# Patient Record
Sex: FEMALE | Race: WHITE | NOT HISPANIC OR LATINO | Employment: FULL TIME | URBAN - METROPOLITAN AREA
[De-identification: names, ages, dates, MRNs, and addresses within clinical notes are randomized per-mention and may not be internally consistent; named-entity substitution may affect disease eponyms.]

---

## 2017-02-08 ENCOUNTER — OFFICE VISIT (OUTPATIENT)
Dept: FAMILY MEDICINE CLINIC | Facility: CLINIC | Age: 27
End: 2017-02-08

## 2017-02-08 VITALS
BODY MASS INDEX: 34.02 KG/M2 | WEIGHT: 192 LBS | OXYGEN SATURATION: 96 % | HEIGHT: 63 IN | HEART RATE: 94 BPM | SYSTOLIC BLOOD PRESSURE: 110 MMHG | DIASTOLIC BLOOD PRESSURE: 80 MMHG

## 2017-02-08 DIAGNOSIS — M54.2 NECK PAIN: Primary | ICD-10-CM

## 2017-02-08 DIAGNOSIS — M54.50 ACUTE BILATERAL LOW BACK PAIN WITHOUT SCIATICA: ICD-10-CM

## 2017-02-08 LAB
BILIRUB BLD-MCNC: NEGATIVE MG/DL
CLARITY, POC: CLEAR
COLOR UR: YELLOW
GLUCOSE UR STRIP-MCNC: NEGATIVE MG/DL
KETONES UR QL: NEGATIVE
LEUKOCYTE EST, POC: NEGATIVE
NITRITE UR-MCNC: NEGATIVE MG/ML
PH UR: 6.5 [PH] (ref 5–8)
PROT UR STRIP-MCNC: NEGATIVE MG/DL
RBC # UR STRIP: NEGATIVE /UL
SP GR UR: 1 (ref 1–1.03)
UROBILINOGEN UR QL: NORMAL

## 2017-02-08 PROCEDURE — 99213 OFFICE O/P EST LOW 20 MIN: CPT | Performed by: PHYSICIAN ASSISTANT

## 2017-02-08 PROCEDURE — 81003 URINALYSIS AUTO W/O SCOPE: CPT | Performed by: PHYSICIAN ASSISTANT

## 2017-02-08 RX ORDER — LEVOTHYROXINE SODIUM 0.12 MG/1
125 TABLET ORAL DAILY
COMMUNITY
Start: 2017-01-17 | End: 2022-03-08 | Stop reason: DRUGHIGH

## 2017-02-08 RX ORDER — TIZANIDINE 4 MG/1
4 TABLET ORAL DAILY
Qty: 30 TABLET | Refills: 11 | Status: SHIPPED | OUTPATIENT
Start: 2017-02-08 | End: 2017-11-01

## 2017-02-08 NOTE — PROGRESS NOTES
Subjective   Olga Osborne is a 26 y.o. female    Back Pain   This is a new problem. The problem occurs intermittently. The problem has been rapidly worsening since onset. The pain is present in the lumbar spine. The pain is at a severity of 7/10. The pain is moderate. The pain is the same all the time. The symptoms are aggravated by sitting, standing and twisting. Stiffness is present in the morning. Associated symptoms include leg pain and numbness. Pertinent negatives include no chest pain, headaches or weakness. She has tried nothing for the symptoms. The treatment provided no relief.   Neck Pain    This is a new problem. The current episode started in the past 7 days. The problem occurs constantly. The problem has been unchanged. The pain is at a severity of 6/10. The pain is moderate. The symptoms are aggravated by bending and position. Associated symptoms include leg pain and numbness. Pertinent negatives include no chest pain, headaches or weakness. Treatments tried: pain when bending neck.       The following portions of the patient's history were reviewed and updated as appropriate: allergies, current medications, past social history and problem list    Review of Systems   Constitutional: Negative for fatigue and unexpected weight change.   Respiratory: Negative for cough, chest tightness and shortness of breath.    Cardiovascular: Negative for chest pain, palpitations and leg swelling.   Gastrointestinal: Negative for nausea.   Musculoskeletal: Positive for back pain and neck pain.   Skin: Negative for color change and rash.   Neurological: Positive for numbness. Negative for dizziness, syncope, weakness and headaches.       Objective     Vitals:    02/08/17 1507   BP: 110/80   Pulse: 94   SpO2: 96%       Physical Exam   Constitutional: She is oriented to person, place, and time. She appears well-developed and well-nourished.   Cardiovascular: Normal rate and regular rhythm.    Pulmonary/Chest: Effort  normal and breath sounds normal.   Abdominal: Soft. Bowel sounds are normal.   Musculoskeletal:        Right shoulder: She exhibits decreased range of motion and tenderness.        Lumbar back: She exhibits decreased range of motion, tenderness, bony tenderness and pain. She exhibits no swelling, no deformity and no spasm.   Neurological: She is alert and oriented to person, place, and time. She has normal reflexes.   Nursing note and vitals reviewed.      Assessment/Plan     Diagnoses and all orders for this visit:    Neck pain  -     tiZANidine (ZANAFLEX) 4 MG tablet; Take 1 tablet by mouth Daily.  -     Ambulatory Referral to Physical Therapy Evaluate and treat    Acute bilateral low back pain without sciatica  -     POC Urinalysis Dipstick, Automated  -     XR Spine Lumbar 2 or 3 View  -     tiZANidine (ZANAFLEX) 4 MG tablet; Take 1 tablet by mouth Daily.  -     Ambulatory Referral to Physical Therapy Evaluate and treat    Other orders  -     levothyroxine (SYNTHROID, LEVOTHROID) 125 MCG tablet; Take 125 mcg by mouth Daily.

## 2017-03-06 ENCOUNTER — HOSPITAL ENCOUNTER (OUTPATIENT)
Dept: PHYSICAL THERAPY | Facility: HOSPITAL | Age: 27
Setting detail: THERAPIES SERIES
Discharge: HOME | End: 2017-03-06

## 2017-03-06 DIAGNOSIS — G89.29 CHRONIC BILATERAL LOW BACK PAIN WITHOUT SCIATICA: ICD-10-CM

## 2017-03-06 DIAGNOSIS — M54.2 NECK PAIN, BILATERAL: Primary | ICD-10-CM

## 2017-03-06 DIAGNOSIS — M54.50 CHRONIC BILATERAL LOW BACK PAIN WITHOUT SCIATICA: ICD-10-CM

## 2017-03-06 DIAGNOSIS — G44.229 CHRONIC TENSION-TYPE HEADACHE, NOT INTRACTABLE: ICD-10-CM

## 2017-03-06 PROCEDURE — 97110 THERAPEUTIC EXERCISES: CPT

## 2017-03-06 PROCEDURE — 97161 PT EVAL LOW COMPLEX 20 MIN: CPT

## 2017-03-06 NOTE — PROGRESS NOTES
Outpatient Physical Therapy Ortho Initial Evaluation  [facility]     Patient Name: Olga Osborne  : 1990  MRN: [id number]  Today's Date: 3/6/2017      Visit Date: 2017    There is no problem list on file for this patient.       Past Medical History   Diagnosis Date   • Hypothyroidism         Past Surgical History   Procedure Laterality Date   • Thyroidectomy  2016       Visit Dx:     ICD-10-CM ICD-9-CM   1. Neck pain, bilateral M54.2 723.1   2. Chronic bilateral low back pain without sciatica M54.5 724.2    G89.29 338.29   3. Chronic tension-type headache, not intractable G44.229 339.12             Patient History       17 1600          History    Chief Complaint Pain;Muscle tenderness;Joint stiffness  -MM      Type of Pain Neck pain;Back pain  -MM      Date Current Problem(s) Began 17  -MM      Brief Description of Current Complaint Client presents with bilateral low back pain and neck pain with headaches. She started noticing pain about a month ago in her neck and low back.   -MM      Patient/Caregiver Goals Improve mobility;Improve strength;Relieve pain  -MM      Hand Dominance right-handed  -MM      Occupation/sports/leisure activities employed at [employer]  -MM      How has patient tried to help current problem? changing positions  -MM      Are you or can you be pregnant No  -MM      Pain     Pain Location Back;Neck  -MM      Pain at Present 2  -MM      Pain at Best 0  -MM      Pain at Worst 4  -MM      Pain Frequency Intermittent   several times per day  -MM      Pain Description Dull;Aching  -MM      Pain Comments pain is worse with computer work. Lying prone provides some relief.  -MM      Tolerance Time- Standing 45 min  -MM      Tolerance Time- Sitting an hour  -MM      Tolerance Time- Walking an hour  -MM      Is your sleep disturbed? Yes  -MM      Is medication used to assist with sleep? Yes   muscle relaxer  -MM      Total hours of sleep  per night 6-7  -MM      Difficulties with ADL's? computer work, laundry  -MM      Fall Risk Assessment    Any falls in the past year: No  -MM      Daily Activities    Primary Language English  -MM      Are you able to read Yes  -MM      Are you able to write Yes  -MM      Pt Participated in POC and Goals Yes  -MM        User Key  (r) = Recorded By, (t) = Taken By, (c) = Cosigned By    Initials Name Provider Type    MM Power Rojas, PT Physical Therapist                PT Ortho       03/06/17 1600    Posture/Observations    Posture/Observations Comments mild upper thoracic kyphosis, mild forward shoulders.  -MM    Sensation    Sensation WNL? WNL  -MM    Myotomal Screen- Upper Quarter Clearing    Shoulder flexion (C5) Bilateral:;5 (Normal)  -MM    Elbow flexion/wrist extension (C6) Bilateral:;WNL  -MM    Elbow extension/wrist flexion (C7) Bilateral:;WNL  -MM    Finger flexion/ (C8) Bilateral:;WNL  -MM    Finger abduction (T1) Bilateral:;WNL  -MM     Bilateral:;WNL  -MM    Cervical/Shoulder ROM Screen    Cervical flexion Normal   50 degrees feels tight  -MM    Cervical extension Normal   48 degrees feels tight  -MM    Cervical lateral flexion Normal   L38 degrees; R: 40 degrees  -MM    Cervical rotation Impaired   R: 63 degrees. L: 55 degrees  -MM    Cervical quadrant (Spurling's) Normal  -MM    Shoulder elevation  Normal  -MM    Myotomal Screen- Lower Quarter Clearing    Hip flexion (L2) Bilateral:;WNL  -MM    Knee extension (L3) Bilateral:;WNL  -MM    Ankle DF (L4) Bilateral:;WNL  -MM    Great toe extension (L5) Bilateral:;WNL  -MM    Ankle PF (S1) Bilateral:;WNL  -MM    Knee flexion (S2) Bilateral:;WNL  -MM    Lumbar ROM Screen- Lower Quarter Clearing    Lumbar Flexion Normal  -MM    Lumbar Extension Impaired   25% with some low back pain/ same with repetition  -MM    Lumbar Lateral Flexion Impaired   75% bilateral  -MM    Lumbar Rotation Normal   some tightness noted to the left  -MM    Lumbar Quadrant   Normal   tightness low back  -MM    Cervical Palpation    Suboccipital --   tenderness on left  -MM    Levator Scapula --   trigger point and tightness manuel upper trap and lev scap  -MM    Upper Traps --   tenderness bilateral cervical paraspinals and u. trap   -MM    Cervical/Thoracic Special Tests    Cervical Distraction (Foraminal Compression vs. Facet Pain) --   no complaints  -MM    Lumbosacral Palpation    SI Bilateral:;Tender  -MM    Lumbosacral Segment Bilateral:;Tender   tender bilateral lower lumbar parasapinals, gluteus medius  -MM    Piriformis Bilateral:;Tender  -MM    Gluteus Silvino Bilateral:;Tender  -MM    Lumbar/SI Special Tests    DAVID (hip vs. SI Dysfunction) Negative  -MM    ROM (Range of Motion)    General ROM Detail mild low back discomfort with press-up. No pain with SKTC. Relief with traction.   -MM    Additional Documentation --   Hip ER AROM: L: 37 degrees. R: 24 degrees  -MM    Left Hip    Hip Extension Gross Movement (4+/5) good plus  -MM    Right Hip    Hip Extension Gross Movement (4+/5) good plus  -MM      User Key  (r) = Recorded By, (t) = Taken By, (c) = Cosigned By    Initials Name Provider Type    MM Power Rojas, PT Physical Therapist                                PT OP Goals       03/06/17 1600       PT Short Term Goals    STG Date to Achieve 03/27/17  -MM     STG 1 Client will report 50% improvement in neck pain and headaches.  -MM     STG 1 Progress New  -MM     STG 2 Client will report 50% improvement in low back pain while sitting at work.  -MM     STG 2 Progress New  -MM     STG 3 Client will be independent with home program for improved strength and mobility.  -MM     STG 3 Progress New  -MM     STG 4 Client will demonstrate neck flexion and extension without any pain or difficulty.  -MM     STG 4 Progress New  -MM     Long Term Goals    LTG Date to Achieve 04/04/14  -MM     LTG 1 Modified Oswestry score will improve to 8% disabilty or less.  -MM     LTG 1 Progress  New  -MM     LTG 2 Neck Disability score will improve to 14% disability or less.  -MM     LTG 2 Progress New  -MM     LTG 3 Bilateral upper trap trigger points will improve to minimal.  -MM     LTG 3 Progress New  -MM     LTG 4 Low back tenderness will improve to minimal.  -MM     LTG 4 Progress New  -MM     LTG 5 Client will demonstrate hip extension strength at 5/5 without pain.  -MM     LTG 5 Progress New  -MM     Time Calculation    PT Goal Re-Cert Due Date 04/06/17  -MM       User Key  (r) = Recorded By, (t) = Taken By, (c) = Cosigned By    Initials Name Provider Type    MM Power Rojas, PT Physical Therapist                PT Assessment/Plan       03/06/17 1600       PT Assessment    Functional Limitations Limitation in home management;Performance in leisure activities;Performance in self-care ADL;Performance in work activities  -MM     Impairments Pain;Muscle strength;Range of motion  -MM     Assessment Comments Client presents with neck pain, mid back pain, low back pain, and headaches that are worse after computer work. Signs and symptoms are consistent with cervical and thoracic pain related to muscle tension and trigger points. Low back pain is consistent with mild paraspinal discomfort and lumbar facet joint pain with some muscular tightness. Client notes relief with manual traction of left or right leg. Skilled intervention is required to minimize neck pain, headaches, and low back pain. Client should benefit from manual therapy and exercises to treat neck pain. Dry needling with ASTYM should help minimize trigger points. Low back pain should benefit from traction and ASTYM, in addition to exercises to maximize strength and mobility.  -MM     Please refer to paper survey for additional self-reported information Yes  -MM     Rehab Potential Fair  -MM     Patient/caregiver participated in establishment of treatment plan and goals Yes  -MM     Patient would benefit from skilled therapy intervention Yes   -MM     PT Plan    PT Frequency 2x/week  -MM     Predicted Duration of Therapy Intervention (days/wks) 6 weeks  -MM     Planned CPT's? PT EVAL LOW COMPLEXITY: 10590;PT THER PROC EA 15 MIN: 80289;PT MANUAL THERAPY EA 15 MIN: 20830;PT TRACTION LUMBAR: 74071  -MM     PT Plan Comments Continue per plan of treatment. Include exercises to maximize strength and mobility for the neck and spine. Include foam roll stretching next visit. Include dry needling for cervical paraspinals and bilateral upper trap trigger points. Include sidelying lumbar flexion mobililzation and ASTYM for lumbar spine and posterior hip. Include lumbar traction and exercises for neck and back strengthening and stretches.   -MM       User Key  (r) = Recorded By, (t) = Taken By, (c) = Cosigned By    Initials Name Provider Type    ALEXANDRA Rojas, PT Physical Therapist                  Exercises       03/06/17 1600          Subjective Pain    Able to rate subjective pain? yes  -MM      Pre-Treatment Pain Level 2  -MM      Post-Treatment Pain Level 2  -MM      Exercise 1    Exercise Name 1 Provided and reviewed home program. Exercises included seated neck rotation with towel assist, upper trap stretch, chest stretch, mid back stretch, door way stretch, angry cat stretch, prone press-ups, single knee to chest, and lower trunk rotation stretch with sheet.  -MM      Cueing 1 Verbal;Tactile;Demo  -MM      Time (Minutes) 1 15  -MM      Treatment Type 1 Ther Ex  -MM        User Key  (r) = Recorded By, (t) = Taken By, (c) = Cosigned By    Initials Name Provider Type    ALEXANDRA Rojas, PT Physical Therapist                              Outcome Measures       03/06/17 1600          Modified Oswestry    Modified Oswestry Score/Comments 16%  -MM      Neck Disability Index    Section 1 - Pain Intensity 1  -MM      Section 2 - Personal Care 0  -MM      Section 3 - Lifting 1  -MM      Section 4 - Work 1  -MM      Section 5 - Headaches 2  -MM      Section 6 -  Concentration 1  -MM      Section 7 - Sleeping 3  -MM      Section 8 - Driving 2  -MM      Section 9 - Reading 2  -MM      Section 10 - Recreation 1  -MM      Neck Disability Index Score 14  -MM      Neck Disability Index Comments 28%  -MM      Functional Assessment    Outcome Measure Options Modifed Owestry;Neck Disability Index (NDI)  -MM        User Key  (r) = Recorded By, (t) = Taken By, (c) = Cosigned By    Initials Name Provider Type    MM Power Rojas, PT Physical Therapist            Time Calculation:   Start Time: 1600  Total Timed Code Minutes- PT: 15 minute(s)     Therapy Charges for Today     Code Description Service Date Service Provider Modifiers Qty    79590505918 HC PT EVAL LOW COMPLEXITY 4 3/6/2017 Power Rojas, PT GP 1    88400047808 HC PT THER PROC EA 15 MIN 3/6/2017 Power Rojas, PT GP 1          PT G-Codes  Outcome Measure Options: Modifed Owestry, Neck Disability Index (NDI)         Power Rojas, PT  3/6/2017      Answers for HPI/ROS submitted by the patient on 2/27/2017   Back pain  Chronicity: new  Onset: 1 to 4 weeks ago  Frequency: daily  Progression since onset: waxing and waning  Pain location: lumbar spine  Pain quality: aching, shooting  Radiates to: left thigh, right thigh  Pain - numeric: 3/10  Pain is: worse during the night  Aggravated by: position, lying down, sitting  Stiffness is present: all day  abdominal pain: No  bladder incontinence: No  bowel incontinence: No  chest pain: No  dysuria: No  fever: No  headaches: Yes  leg pain: Yes  numbness: No  paresis: No  paresthesias: No  pelvic pain: No  perianal numbness: No  tingling: No  weakness: No  weight loss: No  Risk factors: obesity, poor posture

## 2017-03-14 ENCOUNTER — HOSPITAL ENCOUNTER (OUTPATIENT)
Dept: PHYSICAL THERAPY | Facility: HOSPITAL | Age: 27
Setting detail: THERAPIES SERIES
Discharge: HOME | End: 2017-03-14

## 2017-03-14 DIAGNOSIS — G44.229 CHRONIC TENSION-TYPE HEADACHE, NOT INTRACTABLE: ICD-10-CM

## 2017-03-14 DIAGNOSIS — M54.2 NECK PAIN, BILATERAL: Primary | ICD-10-CM

## 2017-03-14 DIAGNOSIS — G89.29 CHRONIC BILATERAL LOW BACK PAIN WITHOUT SCIATICA: ICD-10-CM

## 2017-03-14 DIAGNOSIS — M54.50 CHRONIC BILATERAL LOW BACK PAIN WITHOUT SCIATICA: ICD-10-CM

## 2017-03-14 PROCEDURE — 97012 MECHANICAL TRACTION THERAPY: CPT

## 2017-03-14 PROCEDURE — 97110 THERAPEUTIC EXERCISES: CPT

## 2017-03-14 PROCEDURE — 97140 MANUAL THERAPY 1/> REGIONS: CPT

## 2017-03-14 NOTE — PROGRESS NOTES
Outpatient Physical Therapy Ortho Treatment Note  [facility]     Patient Name: Olga Osborne  : 1990  MRN: [id number]  Today's Date: 3/14/2017      Visit Date: 2017    Visit Dx:    ICD-10-CM ICD-9-CM   1. Neck pain, bilateral M54.2 723.1   2. Chronic bilateral low back pain without sciatica M54.5 724.2    G89.29 338.29   3. Chronic tension-type headache, not intractable G44.229 339.12        Past Medical History   Diagnosis Date   • Hypothyroidism         Past Surgical History   Procedure Laterality Date   • Thyroidectomy  2016                 PT Assessment/Plan       17 0730       PT Assessment    Assessment Comments Relief noted with manual therapy and lumbar traction today. No complaints with treatment. Exercises also tolerated without complaints.  -MM     PT Plan    PT Plan Comments Continue per plan of treatment with manual therapy, lumbar traction, and exercises for neck and back.  -MM       User Key  (r) = Recorded By, (t) = Taken By, (c) = Cosigned By    Initials Name Provider Type    ALEXANDRA Rojas, PT Physical Therapist                Modalities       17 0730          Traction 62363    Traction Type Lumbar  -MM      Rx Minutes 10  -MM      Duration Intermittent  -MM      Position Hook-lying  -MM      Weight 55  -MM      Hold 60  -MM      Relax 10  -MM        User Key  (r) = Recorded By, (t) = Taken By, (c) = Cosigned By    Initials Name Provider Type    ALEXANDRA Rojas, PT Physical Therapist                Exercises       17 0730          Subjective Comments    Subjective Comments Client reports mild neck/left upper trap pain. Her low back feels tight, but overall she feels like prone press-ups are providing relief.  -MM      Subjective Pain    Able to rate subjective pain? yes  -MM      Pre-Treatment Pain Level 2  -MM      Post-Treatment Pain Level 1  -MM      Exercise 1    Exercise Name 1 Included exercises in clinical setting per flow sheet. Updated  home program to include rows, sweeps, and reverse fly with band resistance.  -MM      Cueing 1 Verbal  -MM      Time (Minutes) 1 15  -MM      Treatment Type 1 Ther Ex  -MM        User Key  (r) = Recorded By, (t) = Taken By, (c) = Cosigned By    Initials Name Provider Type    ALEXANDRA Rojas, PT Physical Therapist                        Manual Rx (last 36 hours)      Manual Treatments       03/14/17 1100 03/14/17 0730       Manual Rx 1    Manual Rx 1 Location --  -MM Provided soft tissue mobilization for neck and upper back. Included dry needling for left upper trap, levator scap, and lower cervical paraspinals. Also included dry needling for right upper trap. Client was prone for dry needling. Followed dry needling with ASTYM for neck and upper back. Also included cervical joint mobilizations gentle PA glides grade 2-3 throughout cervical spine. Included mobilization with cervical lateral flexion and rotation bilateral grade 5. Also included passive stretching for rotation and sidebending.   -MM     Manual Rx 1 Duration  30  -MM       User Key  (r) = Recorded By, (t) = Taken By, (c) = Cosigned By    Initials Name Provider Type    ALEXANDRA Rojas, PT Physical Therapist                        Time Calculation:   Start Time: 0730  Total Timed Code Minutes- PT: 45 minute(s)    Therapy Charges for Today     Code Description Service Date Service Provider Modifiers Qty    18528546196  PT THER PROC EA 15 MIN 3/14/2017 Power Rojas, PT GP 1    48765246726 HC PT MANUAL THERAPY EA 15 MIN 3/14/2017 Power Rojas PT GP 2    52009957819 HC PT TRACTION LUMBAR 3/14/2017 Power Rojas, PT GP 1                    Power Rojas, PT  3/14/2017

## 2017-03-16 ENCOUNTER — HOSPITAL ENCOUNTER (OUTPATIENT)
Dept: PHYSICAL THERAPY | Facility: HOSPITAL | Age: 27
Setting detail: THERAPIES SERIES
Discharge: HOME | End: 2017-03-16

## 2017-03-16 DIAGNOSIS — M54.50 CHRONIC BILATERAL LOW BACK PAIN WITHOUT SCIATICA: ICD-10-CM

## 2017-03-16 DIAGNOSIS — G44.229 CHRONIC TENSION-TYPE HEADACHE, NOT INTRACTABLE: ICD-10-CM

## 2017-03-16 DIAGNOSIS — M54.2 NECK PAIN, BILATERAL: Primary | ICD-10-CM

## 2017-03-16 DIAGNOSIS — G89.29 CHRONIC BILATERAL LOW BACK PAIN WITHOUT SCIATICA: ICD-10-CM

## 2017-03-16 PROCEDURE — 97140 MANUAL THERAPY 1/> REGIONS: CPT

## 2017-03-16 PROCEDURE — 97110 THERAPEUTIC EXERCISES: CPT

## 2017-03-16 PROCEDURE — 97012 MECHANICAL TRACTION THERAPY: CPT

## 2017-03-16 NOTE — PROGRESS NOTES
Outpatient Physical Therapy Ortho Treatment Note  [facility]     Patient Name: Olga Osborne  : 1990  MRN: [id number]  Today's Date: 3/16/2017      Visit Date: 2017    Visit Dx:    ICD-10-CM ICD-9-CM   1. Neck pain, bilateral M54.2 723.1   2. Chronic bilateral low back pain without sciatica M54.5 724.2    G89.29 338.29   3. Chronic tension-type headache, not intractable G44.229 339.12        Past Medical History   Diagnosis Date   • Hypothyroidism         Past Surgical History   Procedure Laterality Date   • Thyroidectomy  2016                             PT Assessment/Plan       17 0815       PT Assessment    Assessment Comments Some tightness continues at left upper trap. No complaints with treatment. Some relief with traction. Progression of exercise was tolerated without complaints.  -MM     PT Plan    PT Plan Comments Continue per plan of treatment with exercises, traction, and manual therapy.  -MM       User Key  (r) = Recorded By, (t) = Taken By, (c) = Cosigned By    Initials Name Provider Type    ALEXANDRA Rojas, PT Physical Therapist                Modalities       17 0815          Traction 64956    Traction Type Lumbar  -MM      Rx Minutes 15  -MM      Duration Intermittent  -MM      Position Hook-lying  -MM      Weight 55  -MM      Hold 60  -MM      Relax 10  -MM        User Key  (r) = Recorded By, (t) = Taken By, (c) = Cosigned By    Initials Name Provider Type    ALEXANDRA Rojas, PT Physical Therapist                Exercises       17 0815          Subjective Comments    Subjective Comments Client reports mild neck pain today at 1/10.  -MM      Subjective Pain    Able to rate subjective pain? yes  -MM      Pre-Treatment Pain Level 1  -MM      Post-Treatment Pain Level 0  -MM      Exercise 1    Exercise Name 1 Included exercises in clinical setting per flow sheet.   -MM      Cueing 1 Verbal  -MM      Time (Minutes) 1 10  -MM      Treatment Type 1 Ther  Ex  -MM        User Key  (r) = Recorded By, (t) = Taken By, (c) = Cosigned By    Initials Name Provider Type    ALEXANDRA Rojas PT Physical Therapist                        Manual Rx (last 36 hours)      Manual Treatments       03/16/17 0815          Manual Rx 1    Manual Rx 1 Location Provided soft tissue mobilization using ASTYM for neck and upper back. Also included dry needling for left upper trap. Included stretching for lateral flexion and rotation.   -MM      Manual Rx 1 Duration 20  -MM        User Key  (r) = Recorded By, (t) = Taken By, (c) = Cosigned By    Initials Name Provider Type    ALEXANDRA Rojas PT Physical Therapist                        Time Calculation:   Start Time: 0815  Total Timed Code Minutes- PT: 30 minute(s)    Therapy Charges for Today     Code Description Service Date Service Provider Modifiers Qty    43535309761  PT MANUAL THERAPY EA 15 MIN 3/16/2017 Power Rojas, PT GP 1    22586203222  PT THER PROC EA 15 MIN 3/16/2017 Power Rojas, PT GP 1    [id number]  PT TRACTION LUMBAR 3/16/2017 Power Rojas, PT GP 1                    Power Rojas, PT  3/16/2017

## 2017-03-21 ENCOUNTER — HOSPITAL ENCOUNTER (OUTPATIENT)
Dept: PHYSICAL THERAPY | Facility: HOSPITAL | Age: 27
Setting detail: THERAPIES SERIES
Discharge: HOME | End: 2017-03-21

## 2017-03-21 DIAGNOSIS — M54.2 NECK PAIN, BILATERAL: Primary | ICD-10-CM

## 2017-03-21 DIAGNOSIS — G89.29 CHRONIC BILATERAL LOW BACK PAIN WITHOUT SCIATICA: ICD-10-CM

## 2017-03-21 DIAGNOSIS — G44.229 CHRONIC TENSION-TYPE HEADACHE, NOT INTRACTABLE: ICD-10-CM

## 2017-03-21 DIAGNOSIS — M54.50 CHRONIC BILATERAL LOW BACK PAIN WITHOUT SCIATICA: ICD-10-CM

## 2017-03-21 PROCEDURE — 97140 MANUAL THERAPY 1/> REGIONS: CPT

## 2017-03-21 PROCEDURE — 97110 THERAPEUTIC EXERCISES: CPT

## 2017-03-21 NOTE — PROGRESS NOTES
Outpatient Physical Therapy Ortho Treatment Note  [facility]     Patient Name: Olga Osborne  : 1990  MRN: [id number]  Today's Date: 3/21/2017      Visit Date: 2017    Visit Dx:    ICD-10-CM ICD-9-CM   1. Neck pain, bilateral M54.2 723.1   2. Chronic bilateral low back pain without sciatica M54.5 724.2    G89.29 338.29   3. Chronic tension-type headache, not intractable G44.229 339.12         Past Medical History   Diagnosis Date   • Hypothyroidism         Past Surgical History   Procedure Laterality Date   • Thyroidectomy  2016               PT Assessment/Plan       17 0730       PT Assessment    Assessment Comments Overall good progress. Neck pain was mild today and low back pain resolved. Held on traction because she wasn't having any low back pain. Trigger points noted at levator scap bilateral. Some discomfort at CT junction. Manual therapy and exercises were all tolerated without complaints.  -MM     PT Plan    PT Plan Comments Continue per plan of treatment with manual therapy and exercises.   -MM       User Key  (r) = Recorded By, (t) = Taken By, (c) = Cosigned By    Initials Name Provider Type    ALEXANDRA Rojas, PT Physical Therapist                    Exercises       17 0730          Subjective Comments    Subjective Comments Client reports mild neck pain today at cervical/thoracic junction.  -MM      Subjective Pain    Able to rate subjective pain? yes  -MM      Pre-Treatment Pain Level 1  -MM      Post-Treatment Pain Level 1  -MM      Exercise 1    Exercise Name 1 Included exercises in clinical setting per flow sheet. Progressed exercises to include arm/leg raise on all fours, neck rotation and extension on all fours, and levator scap stretch.  -MM      Cueing 1 Verbal  -MM      Time (Minutes) 1 15  -MM      Treatment Type 1 Ther Ex  -MM        User Key  (r) = Recorded By, (t) = Taken By, (c) = Cosigned By    Initials Name Provider Type    ALEXANDRA Rojas,  PT Physical Therapist                        Manual Rx (last 36 hours)      Manual Treatments       03/21/17 0730          Manual Rx 1    Manual Rx 1 Location Provided soft tissue mobilization using ASTYM technique for neck and upper back. Also included passive stretching for rotation and lateral flexion bilateral. Included prone lateral glides for CT junction. Included seated CT junction manipulation. Also included prone thoracic screw manipulation for mid thoracic region. Client had tigger points bilateral levator scap.  -MM      Manual Rx 1 Duration 30  -MM        User Key  (r) = Recorded By, (t) = Taken By, (c) = Cosigned By    Initials Name Provider Type    MM Power Rojas, PT Physical Therapist                        Time Calculation:   Start Time: 0730  Total Timed Code Minutes- PT: 45 minute(s)    Therapy Charges for Today     Code Description Service Date Service Provider Modifiers Qty    06162909316  PT MANUAL THERAPY EA 15 MIN 3/21/2017 Power Rojas, PT GP 2    [id number]  PT THER PROC EA 15 MIN 3/21/2017 Power oRjas, PT GP 1                    Power Rojas PT  3/21/2017

## 2017-03-27 ENCOUNTER — HOSPITAL ENCOUNTER (OUTPATIENT)
Dept: PHYSICAL THERAPY | Facility: HOSPITAL | Age: 27
Setting detail: THERAPIES SERIES
Discharge: HOME | End: 2017-03-27

## 2017-03-27 DIAGNOSIS — G44.229 CHRONIC TENSION-TYPE HEADACHE, NOT INTRACTABLE: ICD-10-CM

## 2017-03-27 DIAGNOSIS — M54.50 CHRONIC BILATERAL LOW BACK PAIN WITHOUT SCIATICA: ICD-10-CM

## 2017-03-27 DIAGNOSIS — G89.29 CHRONIC BILATERAL LOW BACK PAIN WITHOUT SCIATICA: ICD-10-CM

## 2017-03-27 DIAGNOSIS — M54.2 NECK PAIN, BILATERAL: Primary | ICD-10-CM

## 2017-03-27 PROCEDURE — 97140 MANUAL THERAPY 1/> REGIONS: CPT

## 2017-03-27 PROCEDURE — 97012 MECHANICAL TRACTION THERAPY: CPT

## 2017-03-27 PROCEDURE — 97110 THERAPEUTIC EXERCISES: CPT

## 2017-03-27 NOTE — PROGRESS NOTES
Outpatient Physical Therapy Ortho Treatment Note  [facility]     Patient Name: Olga Osborne  : 1990  MRN: [id number]  Today's Date: 3/27/2017      Visit Date: 2017    Visit Dx:    ICD-10-CM ICD-9-CM   1. Neck pain, bilateral M54.2 723.1   2. Chronic bilateral low back pain without sciatica M54.5 724.2    G89.29 338.29   3. Chronic tension-type headache, not intractable G44.229 339.12          Past Medical History:   Diagnosis Date   • Hypothyroidism         Past Surgical History:   Procedure Laterality Date   • THYROIDECTOMY  2016                             PT Assessment/Plan       17 0730       PT Assessment    Assessment Comments Neck pain continues to be mild. Low back pain is improving, but she noticed some bilateral leg pain today. Traction was included today to minimize radicular leg discomfort. Some relief noted with manual therapy. No complaints with traction.  -MM     PT Plan    PT Plan Comments Continue per plan of treatment with manual therapy, traction, and exercises.   -MM       User Key  (r) = Recorded By, (t) = Taken By, (c) = Cosigned By    Initials Name Provider Type    ALEXANDRA Rojas, PT Physical Therapist                Modalities       17 0730          Traction 34382    Traction Type Lumbar  -MM      Rx Minutes 15  -MM      Duration Intermittent  -MM      Position Hook-lying  -MM      Weight 55  -MM      Hold 60  -MM      Relax 10  -MM        User Key  (r) = Recorded By, (t) = Taken By, (c) = Cosigned By    Initials Name Provider Type    ALEXANDRA Rojas, PT Physical Therapist                Exercises       17 0730          Subjective Comments    Subjective Comments Client reports mild left neck pain today and upper trap tightness. She also noticed bilateral leg pain that started 2 days ago from her hips to calves.   -MM      Subjective Pain    Able to rate subjective pain? yes  -MM      Pre-Treatment Pain Level 1  -MM      Post-Treatment  Pain Level 0  -MM      Exercise 1    Exercise Name 1 Included exercises in clinical setting per flow sheet.   -MM      Cueing 1 Verbal  -MM      Time (Minutes) 1 15  -MM      Treatment Type 1 Ther Ex  -MM        User Key  (r) = Recorded By, (t) = Taken By, (c) = Cosigned By    Initials Name Provider Type    ALEXANDRA Rojas PT Physical Therapist                        Manual Rx (last 36 hours)      Manual Treatments       03/27/17 0730          Manual Rx 1    Manual Rx 1 Location Provided soft tissue mobilization using ASTYM technique for neck and upper thoracic  region. Also included dry needling left upper trap (3 minutes) prior to ASTYM. Trigger point noted left upper trap and levator scap.  -MM      Manual Rx 1 Duration 15  -MM        User Key  (r) = Recorded By, (t) = Taken By, (c) = Cosigned By    Initials Name Provider Type    ALEXANDRA Rojas, PT Physical Therapist                        Time Calculation:   Start Time: 0730  Total Timed Code Minutes- PT: 30 minute(s)    Therapy Charges for Today     Code Description Service Date Service Provider Modifiers Qty    70200018767  PT MANUAL THERAPY EA 15 MIN 3/27/2017 Power Rojas, PT GP 1    [id number]  PT THER PROC EA 15 MIN 3/27/2017 Power Rojas, PT GP 1    [id number]  PT TRACTION LUMBAR 3/27/2017 Power Rojas, PT GP 1                    Power Rojas, PT  3/27/2017

## 2017-04-17 ENCOUNTER — TELEPHONE (OUTPATIENT)
Dept: FAMILY MEDICINE CLINIC | Facility: CLINIC | Age: 27
End: 2017-04-17

## 2017-04-24 ENCOUNTER — DOCUMENTATION (OUTPATIENT)
Dept: PHYSICAL THERAPY | Facility: HOSPITAL | Age: 27
End: 2017-04-24

## 2017-04-24 DIAGNOSIS — G44.229 CHRONIC TENSION-TYPE HEADACHE, NOT INTRACTABLE: ICD-10-CM

## 2017-04-24 DIAGNOSIS — G89.29 CHRONIC BILATERAL LOW BACK PAIN WITHOUT SCIATICA: ICD-10-CM

## 2017-04-24 DIAGNOSIS — M54.50 CHRONIC BILATERAL LOW BACK PAIN WITHOUT SCIATICA: ICD-10-CM

## 2017-04-24 DIAGNOSIS — M54.2 NECK PAIN, BILATERAL: Primary | ICD-10-CM

## 2017-04-24 NOTE — THERAPY DISCHARGE NOTE
Outpatient Physical Therapy Discharge Summary         Patient Name: Olga Osborne  : 1990  MRN: [id number]    Today's Date: 2017    Visit Dx:    ICD-10-CM ICD-9-CM   1. Neck pain, bilateral M54.2 723.1   2. Chronic bilateral low back pain without sciatica M54.5 724.2    G89.29 338.29   3. Chronic tension-type headache, not intractable G44.229 339.12             PT OP Goals       17 0900       PT Short Term Goals    STG Date to Achieve 17  -MM     STG 1 Client will report 50% improvement in neck pain and headaches.  -MM     STG 1 Progress Not Met  -MM     STG 2 Client will report 50% improvement in low back pain while sitting at work.  -MM     STG 2 Progress Not Met  -MM     STG 3 Client will be independent with home program for improved strength and mobility.  -MM     STG 3 Progress Not Met  -MM     STG 4 Client will demonstrate neck flexion and extension without any pain or difficulty.  -MM     STG 4 Progress Not Met  -MM     Long Term Goals    LTG Date to Achieve 14  -MM     LTG 1 Modified Oswestry score will improve to 8% disabilty or less.  -MM     LTG 1 Progress Not Met  -MM     LTG 2 Neck Disability score will improve to 14% disability or less.  -MM     LTG 2 Progress Not Met  -MM     LTG 3 Bilateral upper trap trigger points will improve to minimal.  -MM     LTG 3 Progress Not Met  -MM     LTG 4 Low back tenderness will improve to minimal.  -MM     LTG 4 Progress Not Met  -MM     LTG 5 Client will demonstrate hip extension strength at 5/5 without pain.  -MM     LTG 5 Progress Not Met  -MM       User Key  (r) = Recorded By, (t) = Taken By, (c) = Cosigned By    Initials Name Provider Type    ALEXANDRA Rojas, PT Physical Therapist          OP PT Discharge Summary  Date of Discharge: 17  Reason for Discharge: other (comment) (Client has not returned for further treatment.)  Outcomes Achieved: Other (Goals were abandoned since she hasn't returned.)  Discharge  Destination: Home without follow-up  Discharge Instructions: Client was treated for 5 visits to minimize neck pain, headaches, and low back pain. At last visit she noted improvement with neck pain and back pain. She did have some bilateral leg discomfort. She received lumbar traction and didn't have any complaints with traction. Treatment also included exercises and manual therapy. Prognosis at discharge is fair.            Power Rojas, PT  4/24/2017

## 2017-11-01 ENCOUNTER — APPOINTMENT (OUTPATIENT)
Dept: LAB | Facility: HOSPITAL | Age: 27
End: 2017-11-01

## 2017-11-01 ENCOUNTER — OFFICE VISIT (OUTPATIENT)
Dept: FAMILY MEDICINE CLINIC | Facility: CLINIC | Age: 27
End: 2017-11-01

## 2017-11-01 VITALS
SYSTOLIC BLOOD PRESSURE: 108 MMHG | WEIGHT: 190.4 LBS | HEART RATE: 85 BPM | HEIGHT: 63 IN | RESPIRATION RATE: 14 BRPM | DIASTOLIC BLOOD PRESSURE: 76 MMHG | OXYGEN SATURATION: 98 % | BODY MASS INDEX: 33.73 KG/M2

## 2017-11-01 DIAGNOSIS — R53.83 FATIGUE, UNSPECIFIED TYPE: Primary | ICD-10-CM

## 2017-11-01 DIAGNOSIS — H53.8 VISION BLURRED: ICD-10-CM

## 2017-11-01 LAB
ALBUMIN SERPL-MCNC: 4.6 G/DL (ref 3.2–4.8)
ALBUMIN/GLOB SERPL: 1.7 G/DL (ref 1.5–2.5)
ALP SERPL-CCNC: 53 U/L (ref 25–100)
ALT SERPL W P-5'-P-CCNC: 11 U/L (ref 7–40)
ANION GAP SERPL CALCULATED.3IONS-SCNC: 6 MMOL/L (ref 3–11)
AST SERPL-CCNC: 16 U/L (ref 0–33)
BASOPHILS # BLD AUTO: 0.06 10*3/MM3 (ref 0–0.2)
BASOPHILS NFR BLD AUTO: 1 % (ref 0–1)
BILIRUB SERPL-MCNC: 0.5 MG/DL (ref 0.3–1.2)
BUN BLD-MCNC: 6 MG/DL (ref 9–23)
BUN/CREAT SERPL: 7.5 (ref 7–25)
CALCIUM SPEC-SCNC: 9 MG/DL (ref 8.7–10.4)
CHLORIDE SERPL-SCNC: 102 MMOL/L (ref 99–109)
CO2 SERPL-SCNC: 32 MMOL/L (ref 20–31)
CREAT BLD-MCNC: 0.8 MG/DL (ref 0.6–1.3)
DEPRECATED RDW RBC AUTO: 43.8 FL (ref 37–54)
EOSINOPHIL # BLD AUTO: 0.02 10*3/MM3 (ref 0–0.3)
EOSINOPHIL NFR BLD AUTO: 0.3 % (ref 0–3)
ERYTHROCYTE [DISTWIDTH] IN BLOOD BY AUTOMATED COUNT: 12.9 % (ref 11.3–14.5)
GFR SERPL CREATININE-BSD FRML MDRD: 86 ML/MIN/1.73
GLOBULIN UR ELPH-MCNC: 2.7 GM/DL
GLUCOSE BLD-MCNC: 70 MG/DL (ref 70–100)
HCT VFR BLD AUTO: 43.3 % (ref 34.5–44)
HGB BLD-MCNC: 14.8 G/DL (ref 11.5–15.5)
IMM GRANULOCYTES # BLD: 0.01 10*3/MM3 (ref 0–0.03)
IMM GRANULOCYTES NFR BLD: 0.2 % (ref 0–0.6)
IRON 24H UR-MRATE: 61 MCG/DL (ref 50–175)
LYMPHOCYTES # BLD AUTO: 1.21 10*3/MM3 (ref 0.6–4.8)
LYMPHOCYTES NFR BLD AUTO: 21.1 % (ref 24–44)
MCH RBC QN AUTO: 31.7 PG (ref 27–31)
MCHC RBC AUTO-ENTMCNC: 34.2 G/DL (ref 32–36)
MCV RBC AUTO: 92.7 FL (ref 80–99)
MONOCYTES # BLD AUTO: 0.26 10*3/MM3 (ref 0–1)
MONOCYTES NFR BLD AUTO: 4.5 % (ref 0–12)
NEUTROPHILS # BLD AUTO: 4.17 10*3/MM3 (ref 1.5–8.3)
NEUTROPHILS NFR BLD AUTO: 72.9 % (ref 41–71)
PLATELET # BLD AUTO: 309 10*3/MM3 (ref 150–450)
PMV BLD AUTO: 10.5 FL (ref 6–12)
POTASSIUM BLD-SCNC: 4.6 MMOL/L (ref 3.5–5.5)
PROT SERPL-MCNC: 7.3 G/DL (ref 5.7–8.2)
RBC # BLD AUTO: 4.67 10*6/MM3 (ref 3.89–5.14)
SODIUM BLD-SCNC: 140 MMOL/L (ref 132–146)
WBC NRBC COR # BLD: 5.73 10*3/MM3 (ref 3.5–10.8)

## 2017-11-01 PROCEDURE — 99213 OFFICE O/P EST LOW 20 MIN: CPT | Performed by: PHYSICIAN ASSISTANT

## 2017-11-01 PROCEDURE — 80053 COMPREHEN METABOLIC PANEL: CPT | Performed by: PHYSICIAN ASSISTANT

## 2017-11-01 PROCEDURE — 83540 ASSAY OF IRON: CPT | Performed by: PHYSICIAN ASSISTANT

## 2017-11-01 PROCEDURE — 36415 COLL VENOUS BLD VENIPUNCTURE: CPT | Performed by: PHYSICIAN ASSISTANT

## 2017-11-01 PROCEDURE — 85025 COMPLETE CBC W/AUTO DIFF WBC: CPT | Performed by: PHYSICIAN ASSISTANT

## 2017-11-01 NOTE — PROGRESS NOTES
Subjective   Olga Osborne is a 26 y.o. female    History of Present Illness  Patient 26-year-old white female comes in complaining of fatigue no energy  States has no energy this is been going on for some time it does not seem be related to her previous thyroid and endocrine problems.  Fatigue is worse in the mornings.  No fever no chills no night sweats    Patient comes in complaining of blurred vision she states her left eye she's having episodes of decreased vision in both right and left thighs she states that her eyesight seems be getting worse and she's concerned is been having over the last 3 months denies any eye pain any eye discharge.      The following portions of the patient's history were reviewed and updated as appropriate: allergies, current medications, past social history and problem list    Review of Systems   Constitutional: Positive for fatigue. Negative for appetite change, diaphoresis and unexpected weight change.   Eyes: Negative for visual disturbance.   Respiratory: Negative for cough, chest tightness and shortness of breath.    Cardiovascular: Negative for chest pain, palpitations and leg swelling.   Gastrointestinal: Negative for diarrhea, nausea and vomiting.   Endocrine: Negative for polydipsia, polyphagia and polyuria.   Skin: Negative for color change and rash.   Neurological: Negative for dizziness, syncope, weakness, light-headedness, numbness and headaches.       Objective     Vitals:    11/01/17 1154   BP: 108/76   Pulse: 85   Resp: 14   SpO2: 98%       Physical Exam   Constitutional: She appears well-developed and well-nourished.   Neck: Neck supple. No JVD present. No thyromegaly present.   Cardiovascular: Normal rate, regular rhythm, normal heart sounds, intact distal pulses and normal pulses.    No murmur heard.  Pulmonary/Chest: Effort normal and breath sounds normal. No respiratory distress.   Abdominal: Soft. Bowel sounds are normal. There is no hepatosplenomegaly. There is  no tenderness.   Musculoskeletal: She exhibits no edema.   Lymphadenopathy:     She has no cervical adenopathy.   Neurological: No sensory deficit.   Skin: Skin is warm and dry. She is not diaphoretic.   Nursing note and vitals reviewed.      Assessment/Plan     Diagnoses and all orders for this visit:    Fatigue, unspecified type  -     CBC & Differential  -     Comprehensive Metabolic Panel  -     Iron  -     CBC Auto Differential    Vision blurred  -     Ambulatory Referral to Ophthalmology    follow-up as needed

## 2017-11-03 ENCOUNTER — TELEPHONE (OUTPATIENT)
Dept: FAMILY MEDICINE CLINIC | Facility: CLINIC | Age: 27
End: 2017-11-03

## 2017-11-03 NOTE — TELEPHONE ENCOUNTER
----- Message from Cielo Hatch sent at 11/3/2017 12:11 PM EDT -----  Contact: PT.  PT. SEE'S KYLEE.  PT. IS WANTING THE RESULTS OF HER LABS DONE ON:  Tuesday, @ [facility].  PT. CAN BE REACHED @: [facility] #.

## 2017-11-06 ENCOUNTER — TELEPHONE (OUTPATIENT)
Dept: FAMILY MEDICINE CLINIC | Facility: CLINIC | Age: 27
End: 2017-11-06

## 2017-11-06 NOTE — TELEPHONE ENCOUNTER
----- Message from Alyssa Matthews sent at 11/6/2017  9:04 AM EST -----  Contact: PATIENT  PLEASE CALL HER WITH LAB RESULTS FROM LAST WEEK. [phone]

## 2018-04-02 ENCOUNTER — OFFICE VISIT (OUTPATIENT)
Dept: FAMILY MEDICINE CLINIC | Facility: CLINIC | Age: 28
End: 2018-04-02

## 2018-04-02 VITALS
OXYGEN SATURATION: 98 % | HEART RATE: 75 BPM | BODY MASS INDEX: 34.34 KG/M2 | WEIGHT: 193.8 LBS | RESPIRATION RATE: 14 BRPM | TEMPERATURE: 98.5 F | DIASTOLIC BLOOD PRESSURE: 72 MMHG | HEIGHT: 63 IN | SYSTOLIC BLOOD PRESSURE: 104 MMHG

## 2018-04-02 DIAGNOSIS — R10.9 ABDOMINAL PAIN, UNSPECIFIED ABDOMINAL LOCATION: Primary | ICD-10-CM

## 2018-04-02 LAB
BILIRUB BLD-MCNC: NEGATIVE MG/DL
CLARITY, POC: CLEAR
COLOR UR: NORMAL
GLUCOSE UR STRIP-MCNC: NEGATIVE MG/DL
KETONES UR QL: NEGATIVE
LEUKOCYTE EST, POC: NEGATIVE
NITRITE UR-MCNC: NEGATIVE MG/ML
PH UR: 7 [PH] (ref 5–8)
PROT UR STRIP-MCNC: NEGATIVE MG/DL
RBC # UR STRIP: NEGATIVE /UL
SP GR UR: 1 (ref 1–1.03)
UROBILINOGEN UR QL: NORMAL

## 2018-04-02 PROCEDURE — 81003 URINALYSIS AUTO W/O SCOPE: CPT | Performed by: PHYSICIAN ASSISTANT

## 2018-04-02 PROCEDURE — 99213 OFFICE O/P EST LOW 20 MIN: CPT | Performed by: PHYSICIAN ASSISTANT

## 2018-04-02 RX ORDER — CIPROFLOXACIN 500 MG/1
500 TABLET, FILM COATED ORAL 2 TIMES DAILY
Qty: 14 TABLET | Refills: 0 | Status: SHIPPED | OUTPATIENT
Start: 2018-04-02 | End: 2019-01-02

## 2018-04-02 RX ORDER — FLUCONAZOLE 150 MG/1
150 TABLET ORAL ONCE
Qty: 1 TABLET | Refills: 0 | Status: SHIPPED | OUTPATIENT
Start: 2018-04-02 | End: 2018-04-02

## 2018-04-02 NOTE — PROGRESS NOTES
Subjective   Olga Osborne is a 27 y.o. female  Abdominal Pain (Oliguria, shooting pains in groin area intermittent x1 week)      History of Present Illness  Patient pleasant 27-year-old white female comes in complaining of left lower quadrant pain patient's pain off and on for last week.  Patient's pain is 5 out of 10.  Patient states comes and goes she states that she's had occasional dysuria patient's pain is worse with sitting standing.  No fever no chills no bloody diarrhea.  Patient denies any chance of pregnancy    The following portions of the patient's history were reviewed and updated as appropriate: allergies, current medications, past social history and problem list    Review of Systems   Constitutional: Negative for appetite change, diaphoresis, fatigue and unexpected weight change.   Eyes: Negative for visual disturbance.   Respiratory: Negative for cough, chest tightness and shortness of breath.    Cardiovascular: Negative for chest pain, palpitations and leg swelling.   Gastrointestinal: Positive for abdominal pain. Negative for diarrhea, nausea and vomiting.   Endocrine: Negative for polydipsia, polyphagia and polyuria.   Skin: Negative for color change and rash.   Neurological: Negative for dizziness, syncope, weakness, light-headedness, numbness and headaches.       Objective     Vitals:    04/02/18 1043   BP: 104/72   Pulse: 75   Resp: 14   Temp: 98.5 °F (36.9 °C)   SpO2: 98%       Physical Exam   Constitutional: She appears well-developed and well-nourished.   Neck: Neck supple. No JVD present. No thyromegaly present.   Cardiovascular: Normal rate, regular rhythm, normal heart sounds, intact distal pulses and normal pulses.    No murmur heard.  Pulmonary/Chest: Effort normal and breath sounds normal. No respiratory distress.   Abdominal: Soft. Bowel sounds are normal. There is no hepatosplenomegaly. There is no tenderness.       Musculoskeletal: She exhibits no edema.   Lymphadenopathy:     She  has no cervical adenopathy.   Neurological: No sensory deficit.   Skin: Skin is warm and dry. She is not diaphoretic.   Nursing note and vitals reviewed.      Assessment/Plan     Diagnoses and all orders for this visit:    Abdominal pain, unspecified abdominal location  -     POCT urinalysis dipstick, automated  -     ciprofloxacin (CIPRO) 500 MG tablet; Take 1 tablet by mouth 2 (Two) Times a Day.  -     fluconazole (DIFLUCAN) 150 MG tablet; Take 1 tablet by mouth 1 (One) Time for 1 dose.    Follow-up if no better consider diverticulosis/ diverticulitis    Follow-up if no better

## 2018-07-24 DIAGNOSIS — M54.50 ACUTE BILATERAL LOW BACK PAIN WITHOUT SCIATICA: ICD-10-CM

## 2018-07-24 DIAGNOSIS — M54.2 NECK PAIN: ICD-10-CM

## 2018-07-25 RX ORDER — TIZANIDINE 4 MG/1
TABLET ORAL
Qty: 30 TABLET | Refills: 10 | OUTPATIENT
Start: 2018-07-25

## 2018-10-19 LAB
EXTERNAL HEPATITIS B SURFACE ANTIGEN: NEGATIVE
EXTERNAL RUBELLA QUALITATIVE: NORMAL
EXTERNAL SYPHILIS RPR SCREEN: NORMAL

## 2019-01-02 ENCOUNTER — OFFICE VISIT (OUTPATIENT)
Dept: FAMILY MEDICINE CLINIC | Facility: CLINIC | Age: 29
End: 2019-01-02

## 2019-01-02 VITALS
SYSTOLIC BLOOD PRESSURE: 116 MMHG | HEIGHT: 63 IN | TEMPERATURE: 98.4 F | HEART RATE: 78 BPM | WEIGHT: 189 LBS | BODY MASS INDEX: 33.49 KG/M2 | OXYGEN SATURATION: 98 % | DIASTOLIC BLOOD PRESSURE: 72 MMHG

## 2019-01-02 DIAGNOSIS — J20.9 ACUTE BRONCHITIS, UNSPECIFIED ORGANISM: Primary | ICD-10-CM

## 2019-01-02 PROCEDURE — 99213 OFFICE O/P EST LOW 20 MIN: CPT | Performed by: PHYSICIAN ASSISTANT

## 2019-01-02 RX ORDER — ALBUTEROL SULFATE 90 UG/1
AEROSOL, METERED RESPIRATORY (INHALATION)
Qty: 1 INHALER | Refills: 0 | Status: SHIPPED | OUTPATIENT
Start: 2019-01-02 | End: 2019-02-04 | Stop reason: ALTCHOICE

## 2019-01-02 RX ORDER — PRENATAL VIT NO.126/IRON/FOLIC 28MG-0.8MG
TABLET ORAL DAILY
COMMUNITY
End: 2019-01-22

## 2019-01-02 RX ORDER — PREDNISONE 20 MG/1
20 TABLET ORAL DAILY
Qty: 5 TABLET | Refills: 0 | Status: SHIPPED | OUTPATIENT
Start: 2019-01-02 | End: 2019-01-22

## 2019-01-02 RX ORDER — AMOXICILLIN 875 MG/1
875 TABLET, COATED ORAL 2 TIMES DAILY
Qty: 20 TABLET | Refills: 0 | Status: SHIPPED | OUTPATIENT
Start: 2019-01-02 | End: 2019-01-22

## 2019-01-02 NOTE — PROGRESS NOTES
Subjective   Olga Osborne is a 28 y.o. female  Cough (dry cough x1 month )      History of Present Illness  Patient comes in for evaluation of a persistent cough for the past 4 weeks.  She states this started when she got a cold and the cough has persisted.  She states she doesn't feel sick she just keeps coughing day and night.  She denies shortness breath denies wheezing, she is a nonsmoker, has no history of asthma, she is 19 weeks pregnant.  She's been using plain Robitussin over-the-counter without any relief.  She has some nasal congestion.  The following portions of the patient's history were reviewed and updated as appropriate: allergies, current medications, past social history and problem list    Review of Systems   Constitutional: Negative for fever.   HENT: Positive for congestion, postnasal drip and rhinorrhea. Negative for sinus pressure, sneezing, sore throat and voice change.    Respiratory: Positive for cough. Negative for chest tightness, shortness of breath and wheezing.    Cardiovascular: Negative.        Objective     Vitals:    01/02/19 1026   BP: 116/72   Pulse: 78   Temp: 98.4 °F (36.9 °C)   SpO2: 98%       Physical Exam   Constitutional: She appears well-developed and well-nourished.  Non-toxic appearance. She does not have a sickly appearance. She does not appear ill. No distress.   HENT:   Head: Normocephalic and atraumatic.   Nose: Nose normal.   Mouth/Throat: Oropharynx is clear and moist.   Neck: Neck supple. No JVD present.   Cardiovascular: Normal rate, regular rhythm and normal heart sounds.   No murmur heard.  Pulmonary/Chest: Effort normal. No stridor. No respiratory distress. She has wheezes ( Light wheezes with expiration).   Musculoskeletal: She exhibits no edema.   Lymphadenopathy:     She has no cervical adenopathy.   Neurological: She is alert.   Skin: Skin is warm and dry. She is not diaphoretic. No pallor.   Nursing note and vitals reviewed.      Assessment/Plan      Diagnoses and all orders for this visit:    Acute bronchitis, unspecified organism    Other orders  -     Prenatal Vit-Fe Fumarate-FA (PRENATAL, CLASSIC, VITAMIN) 28-0.8 MG tablet tablet; Take  by mouth Daily.  -     amoxicillin (AMOXIL) 875 MG tablet; Take 1 tablet by mouth 2 (Two) Times a Day.  -     predniSONE (DELTASONE) 20 MG tablet; Take 1 tablet by mouth Daily.  -     albuterol sulfate  (90 Base) MCG/ACT inhaler; Use 1-2 puffs every 4 hours as needed for cough associated with bronchitis

## 2019-01-11 ENCOUNTER — TELEPHONE (OUTPATIENT)
Dept: FAMILY MEDICINE CLINIC | Facility: CLINIC | Age: 29
End: 2019-01-11

## 2019-01-11 RX ORDER — AZITHROMYCIN 250 MG/1
TABLET, FILM COATED ORAL
Qty: 6 TABLET | Refills: 0 | Status: SHIPPED | OUTPATIENT
Start: 2019-01-11 | End: 2019-01-22

## 2019-01-11 NOTE — TELEPHONE ENCOUNTER
Patient was last seen on 01/02/2019, tried to call patient to get more info on symptoms- no voicemail

## 2019-01-11 NOTE — TELEPHONE ENCOUNTER
----- Message from Alyssa Matthews sent at 1/11/2019 12:40 PM EST -----  Contact: PATIENT  SHE WAS IN ON 1/2/19 WITH BRONCHITIS. HAS ONE DAY OF ANTIBIOTIC LEFT AND HAS NOT IMPROVED MUCH. SHE IS NOW COUGHING A LOT. WANTED TO KNOW IF SOMETHING ELSE CAN BE SENT IN FOR HER.     [facility] [address] AT [facility] - [phone]  - [phone] (Phone)  [phone] (Fax)

## 2019-01-22 ENCOUNTER — OFFICE VISIT (OUTPATIENT)
Dept: FAMILY MEDICINE CLINIC | Facility: CLINIC | Age: 29
End: 2019-01-22

## 2019-01-22 VITALS
DIASTOLIC BLOOD PRESSURE: 78 MMHG | HEART RATE: 91 BPM | SYSTOLIC BLOOD PRESSURE: 122 MMHG | OXYGEN SATURATION: 99 % | HEIGHT: 63 IN | WEIGHT: 191 LBS | BODY MASS INDEX: 33.84 KG/M2 | TEMPERATURE: 98.4 F

## 2019-01-22 DIAGNOSIS — R05.3 CHRONIC COUGH: Primary | ICD-10-CM

## 2019-01-22 DIAGNOSIS — J98.01 BRONCHOSPASM: ICD-10-CM

## 2019-01-22 DIAGNOSIS — Z3A.22 22 WEEKS GESTATION OF PREGNANCY: ICD-10-CM

## 2019-01-22 PROCEDURE — 99213 OFFICE O/P EST LOW 20 MIN: CPT | Performed by: PHYSICIAN ASSISTANT

## 2019-01-22 RX ORDER — ASCORBIC ACID, CHOLECALCIFEROL, .ALPHA.-TOCOPHEROL ACETATE, DL-, PYRIDOXINE, FOLIC ACID, CYANOCOBALAMIN, CALCIUM, FERROUS FUMARATE, MAGNESIUM, DOCONEXENT 85; 200; 10; 25; 1; 12; 140; 27; 45; 300 [IU]/1; [IU]/1; [IU]/1; [IU]/1; MG/1; UG/1; MG/1; MG/1; MG/1; MG/1
CAPSULE, GELATIN COATED ORAL
COMMUNITY
Start: 2018-11-26 | End: 2022-03-08

## 2019-01-22 NOTE — PROGRESS NOTES
Subjective   Olga Osborne is a 28 y.o. female  Cough (Consistant dry cough x3 months )      History of Present Illness  Patient comes in today for evaluation of a persistent dry cough with wheezing for the past 3 months.  Please see previous office notes, patient has been treated with amoxicillin, Z-Josiah and oral prednisone as well as albuterol inhaler.  She states none of these have made any difference at all.  She coughs more after talking and when going to sleep at night.  She is 22 weeks pregnant.  She denies fever states cough is dry she denies any nasal congestion and denies any significant acid reflux.  Has no history of asthma or allergies.  The following portions of the patient's history were reviewed and updated as appropriate: allergies, current medications, past social history and problem list    Review of Systems   Constitutional: Negative for chills, diaphoresis, fatigue, fever and unexpected weight change.   HENT: Negative for congestion.    Respiratory: Positive for cough and wheezing. Negative for choking, chest tightness and shortness of breath.    Cardiovascular: Negative for chest pain and leg swelling.   Gastrointestinal: Negative.  Negative for nausea.   Musculoskeletal: Negative for back pain.   Allergic/Immunologic: Negative for environmental allergies, food allergies and immunocompromised state.   Psychiatric/Behavioral: Positive for sleep disturbance.       Objective     Vitals:    01/22/19 1004   BP: 122/78   Pulse: 91   Temp: 98.4 °F (36.9 °C)   SpO2: 99%       Physical Exam   Constitutional: She appears well-developed and well-nourished.  Non-toxic appearance. She does not have a sickly appearance. She does not appear ill. No distress.   Cardiovascular: Normal rate, regular rhythm and normal heart sounds.   No murmur heard.  Pulmonary/Chest: Effort normal. No respiratory distress. She has wheezes. She has no rales. She exhibits no tenderness.   Skin: Skin is warm and dry. She is not  diaphoretic.   Nursing note and vitals reviewed.      Assessment/Plan     Diagnoses and all orders for this visit:    Chronic cough    Bronchospasm    22 weeks gestation of pregnancy    Other orders  -     Prenat w/o O-DN-Lrfimvk-FA-DHA (PNV-DHA) 27-0.6-0.4-300 MG capsule;   -     fluticasone-salmeterol (ADVAIR DISKUS) 250-50 MCG/DOSE DISKUS; Inhale 1 puff 2 (Two) Times a Day. Rinse mouth out after use    Patient will call back and notify me she is feeling better with the next week if symptoms persist would recommend referral to pulmonary and a trial of a PPI.

## 2019-02-04 ENCOUNTER — OFFICE VISIT (OUTPATIENT)
Dept: FAMILY MEDICINE CLINIC | Facility: CLINIC | Age: 29
End: 2019-02-04

## 2019-02-04 VITALS
SYSTOLIC BLOOD PRESSURE: 118 MMHG | HEART RATE: 93 BPM | BODY MASS INDEX: 34.91 KG/M2 | HEIGHT: 63 IN | OXYGEN SATURATION: 99 % | DIASTOLIC BLOOD PRESSURE: 70 MMHG | TEMPERATURE: 98.5 F | WEIGHT: 197 LBS

## 2019-02-04 DIAGNOSIS — R05.3 CHRONIC COUGH: Primary | ICD-10-CM

## 2019-02-04 DIAGNOSIS — M76.892 TENDINITIS OF LEFT KNEE: ICD-10-CM

## 2019-02-04 PROCEDURE — 99213 OFFICE O/P EST LOW 20 MIN: CPT | Performed by: PHYSICIAN ASSISTANT

## 2019-02-04 RX ORDER — RANITIDINE 150 MG/1
150 TABLET ORAL 2 TIMES DAILY
Qty: 60 TABLET | Refills: 1 | Status: SHIPPED | OUTPATIENT
Start: 2019-02-04 | End: 2019-05-29 | Stop reason: HOSPADM

## 2019-02-04 NOTE — PROGRESS NOTES
Subjective   Olga Osborne is a 28 y.o. female  Cough (dry cough with wheezing since November ) and Knee Pain (Left knee pain with burning sensation x2 weeks )      History of Present Illness  Patient comes in today for evaluation 2 separate issues one is a persistent dry cough with wheezing since November.  Please see multiple previous office notes.  Patient is currently pregnant.  No improvement whatsoever has been noted by patient with albuterol or Advair inhaler.  She says she doesn't feel sick, no fever, no shortness of breath she just has a dry cough day and night.  She denies any nasal congestion or postnasal drainage.  Symptoms have not improved with antibiotics.  No history of asthma in the past.  The following portions of the patient's history were reviewed and updated as appropriate: allergies, current medications, past social history and problem list    Review of Systems   Constitutional: Negative.    HENT: Negative.    Respiratory: Positive for cough. Negative for apnea, choking, chest tightness, shortness of breath, wheezing and stridor.    Gastrointestinal: Negative.    Musculoskeletal: Positive for arthralgias.        Patient has experienced some tenderness on the lateral side of her left knee On and off for the last couple of weeks, no history of trauma, no swelling       Objective     Vitals:    02/04/19 1500   BP: 118/70   Pulse: 93   Temp: 98.5 °F (36.9 °C)   SpO2: 99%       Physical Exam   Constitutional: She appears well-developed and well-nourished. No distress.   HENT:   Head: Normocephalic and atraumatic.   Right Ear: External ear normal.   Left Ear: External ear normal.   Nose: Nose normal.   Mouth/Throat: Oropharynx is clear and moist. No oropharyngeal exudate.   Eyes: Conjunctivae are normal. Right eye exhibits no discharge. Left eye exhibits no discharge.   Neck: Normal range of motion. Neck supple. No JVD present.   Cardiovascular: Normal rate, regular rhythm, normal heart sounds and  intact distal pulses.   No murmur heard.  Pulmonary/Chest: Effort normal and breath sounds normal. No respiratory distress. She exhibits no tenderness.   Abdominal: Soft. She exhibits no distension. There is no tenderness.   Musculoskeletal: Normal range of motion. She exhibits no edema, tenderness or deformity.   Lymphadenopathy:     She has no cervical adenopathy.   Skin: Skin is warm and dry. She is not diaphoretic. No erythema. No pallor.   Psychiatric: She has a normal mood and affect.   Nursing note and vitals reviewed.      Assessment/Plan     Diagnoses and all orders for this visit:    Chronic cough  -     Ambulatory Referral to Pulmonology    Tendinitis of left knee    Other orders  -     raNITIdine (ZANTAC) 150 MG tablet; Take 1 tablet by mouth 2 (Two) Times a Day.    Discussed recommendations of stretching exercises, cool compress and Tylenol as needed for knee, will try Zantac for possible GERD as cause of cough and refer to pulmonary.  She is to discontinue her steroid inhaler

## 2019-05-02 ENCOUNTER — TRANSCRIBE ORDERS (OUTPATIENT)
Dept: LAB | Facility: HOSPITAL | Age: 29
End: 2019-05-02

## 2019-05-02 ENCOUNTER — HOSPITAL ENCOUNTER (OUTPATIENT)
Facility: HOSPITAL | Age: 29
End: 2019-05-02
Attending: OBSTETRICS & GYNECOLOGY | Admitting: OBSTETRICS & GYNECOLOGY

## 2019-05-02 ENCOUNTER — LAB (OUTPATIENT)
Dept: LAB | Facility: HOSPITAL | Age: 29
End: 2019-05-02

## 2019-05-02 DIAGNOSIS — Z34.83 PRENATAL CARE, SUBSEQUENT PREGNANCY, THIRD TRIMESTER: Primary | ICD-10-CM

## 2019-05-02 DIAGNOSIS — Z34.83 PRENATAL CARE, SUBSEQUENT PREGNANCY, THIRD TRIMESTER: ICD-10-CM

## 2019-05-02 PROCEDURE — 87081 CULTURE SCREEN ONLY: CPT

## 2019-05-05 LAB — BACTERIA SPEC AEROBE CULT: ABNORMAL

## 2019-05-26 ENCOUNTER — PREP FOR SURGERY (OUTPATIENT)
Dept: OTHER | Facility: HOSPITAL | Age: 29
End: 2019-05-26

## 2019-05-26 ENCOUNTER — HOSPITAL ENCOUNTER (INPATIENT)
Dept: LABOR AND DELIVERY | Facility: HOSPITAL | Age: 29
LOS: 3 days | Discharge: HOME | End: 2019-05-29
Attending: OBSTETRICS & GYNECOLOGY | Admitting: OBSTETRICS & GYNECOLOGY

## 2019-05-26 PROBLEM — Z34.90 PREGNANCY: Status: ACTIVE | Noted: 2019-05-26

## 2019-05-26 LAB
ABO GROUP BLD: NORMAL
AMPHET+METHAMPHET UR QL: NEGATIVE
AMPHETAMINES UR QL: NEGATIVE
ANTI-D, PASSIVE: NORMAL
BARBITURATES UR QL SCN: NEGATIVE
BASOPHILS # BLD AUTO: 0.02 10*3/MM3 (ref 0–0.2)
BASOPHILS NFR BLD AUTO: 0.2 % (ref 0–1.5)
BENZODIAZ UR QL SCN: NEGATIVE
BLD GP AB SCN SERPL QL: POSITIVE
BUPRENORPHINE SERPL-MCNC: NEGATIVE NG/ML
CANNABINOIDS SERPL QL: NEGATIVE
COCAINE UR QL: NEGATIVE
DEPRECATED RDW RBC AUTO: 42.6 FL (ref 37–54)
EOSINOPHIL # BLD AUTO: 0.07 10*3/MM3 (ref 0–0.4)
EOSINOPHIL NFR BLD AUTO: 0.5 % (ref 0.3–6.2)
ERYTHROCYTE [DISTWIDTH] IN BLOOD BY AUTOMATED COUNT: 13.7 % (ref 12.3–15.4)
HCT VFR BLD AUTO: 31.6 % (ref 34–46.6)
HGB BLD-MCNC: 10.7 G/DL (ref 12–15.9)
IMM GRANULOCYTES # BLD AUTO: 0.04 10*3/MM3 (ref 0–0.05)
IMM GRANULOCYTES NFR BLD AUTO: 0.3 % (ref 0–0.5)
LYMPHOCYTES # BLD AUTO: 1.78 10*3/MM3 (ref 0.7–3.1)
LYMPHOCYTES NFR BLD AUTO: 13.6 % (ref 19.6–45.3)
MCH RBC QN AUTO: 28.9 PG (ref 26.6–33)
MCHC RBC AUTO-ENTMCNC: 33.9 G/DL (ref 31.5–35.7)
MCV RBC AUTO: 85.4 FL (ref 79–97)
METHADONE UR QL SCN: NEGATIVE
MONOCYTES # BLD AUTO: 0.62 10*3/MM3 (ref 0.1–0.9)
MONOCYTES NFR BLD AUTO: 4.7 % (ref 5–12)
NEUTROPHILS # BLD AUTO: 10.58 10*3/MM3 (ref 1.7–7)
NEUTROPHILS NFR BLD AUTO: 80.7 % (ref 42.7–76)
OPIATES UR QL: NEGATIVE
OXYCODONE UR QL SCN: NEGATIVE
PCP UR QL SCN: NEGATIVE
PLATELET # BLD AUTO: 290 10*3/MM3 (ref 140–450)
PMV BLD AUTO: 11.4 FL (ref 6–12)
PROPOXYPH UR QL: NEGATIVE
RBC # BLD AUTO: 3.7 10*6/MM3 (ref 3.77–5.28)
RH BLD: NEGATIVE
T&S EXPIRATION DATE: NORMAL
TRICYCLICS UR QL SCN: NEGATIVE
WBC NRBC COR # BLD: 13.11 10*3/MM3 (ref 3.4–10.8)

## 2019-05-26 PROCEDURE — 59025 FETAL NON-STRESS TEST: CPT

## 2019-05-26 PROCEDURE — 86900 BLOOD TYPING SEROLOGIC ABO: CPT

## 2019-05-26 PROCEDURE — 59200 INSERT CERVICAL DILATOR: CPT | Performed by: OBSTETRICS & GYNECOLOGY

## 2019-05-26 PROCEDURE — 3E033VJ INTRODUCTION OF OTHER HORMONE INTO PERIPHERAL VEIN, PERCUTANEOUS APPROACH: ICD-10-PCS | Performed by: OBSTETRICS & GYNECOLOGY

## 2019-05-26 PROCEDURE — 86901 BLOOD TYPING SEROLOGIC RH(D): CPT | Performed by: OBSTETRICS & GYNECOLOGY

## 2019-05-26 PROCEDURE — 86900 BLOOD TYPING SEROLOGIC ABO: CPT | Performed by: OBSTETRICS & GYNECOLOGY

## 2019-05-26 PROCEDURE — 86901 BLOOD TYPING SEROLOGIC RH(D): CPT

## 2019-05-26 PROCEDURE — 86850 RBC ANTIBODY SCREEN: CPT | Performed by: OBSTETRICS & GYNECOLOGY

## 2019-05-26 PROCEDURE — 25010000002 PENICILLIN G POTASSIUM PER 600000 UNITS: Performed by: OBSTETRICS & GYNECOLOGY

## 2019-05-26 PROCEDURE — 85025 COMPLETE CBC W/AUTO DIFF WBC: CPT | Performed by: OBSTETRICS & GYNECOLOGY

## 2019-05-26 PROCEDURE — 80306 DRUG TEST PRSMV INSTRMNT: CPT | Performed by: OBSTETRICS & GYNECOLOGY

## 2019-05-26 PROCEDURE — 86870 RBC ANTIBODY IDENTIFICATION: CPT | Performed by: OBSTETRICS & GYNECOLOGY

## 2019-05-26 PROCEDURE — 25010000002 BUTORPHANOL PER 1 MG: Performed by: OBSTETRICS & GYNECOLOGY

## 2019-05-26 RX ORDER — METHYLERGONOVINE MALEATE 0.2 MG/ML
200 INJECTION INTRAVENOUS ONCE AS NEEDED
Status: CANCELLED | OUTPATIENT
Start: 2019-05-26

## 2019-05-26 RX ORDER — MAGNESIUM CARB/ALUMINUM HYDROX 105-160MG
30 TABLET,CHEWABLE ORAL ONCE
Status: DISCONTINUED | OUTPATIENT
Start: 2019-05-26 | End: 2019-05-27 | Stop reason: HOSPADM

## 2019-05-26 RX ORDER — OXYTOCIN-SODIUM CHLORIDE 0.9% IV SOLN 30 UNIT/500ML 30-0.9/5 UT/ML-%
2-24 SOLUTION INTRAVENOUS
Status: CANCELLED | OUTPATIENT
Start: 2019-05-26

## 2019-05-26 RX ORDER — SODIUM CHLORIDE 0.9 % (FLUSH) 0.9 %
1-10 SYRINGE (ML) INJECTION AS NEEDED
Status: DISCONTINUED | OUTPATIENT
Start: 2019-05-26 | End: 2019-05-27 | Stop reason: HOSPADM

## 2019-05-26 RX ORDER — MISOPROSTOL 100 MCG
25 TABLET ORAL ONCE
Status: COMPLETED | OUTPATIENT
Start: 2019-05-26 | End: 2019-05-26

## 2019-05-26 RX ORDER — BUTORPHANOL TARTRATE 1 MG/ML
1 INJECTION, SOLUTION INTRAMUSCULAR; INTRAVENOUS
Status: CANCELLED | OUTPATIENT
Start: 2019-05-26

## 2019-05-26 RX ORDER — SODIUM CHLORIDE 0.9 % (FLUSH) 0.9 %
3 SYRINGE (ML) INJECTION EVERY 12 HOURS SCHEDULED
Status: CANCELLED | OUTPATIENT
Start: 2019-05-26

## 2019-05-26 RX ORDER — OXYTOCIN-SODIUM CHLORIDE 0.9% IV SOLN 30 UNIT/500ML 30-0.9/5 UT/ML-%
85 SOLUTION INTRAVENOUS ONCE
Status: CANCELLED | OUTPATIENT
Start: 2019-05-26

## 2019-05-26 RX ORDER — OXYTOCIN-SODIUM CHLORIDE 0.9% IV SOLN 30 UNIT/500ML 30-0.9/5 UT/ML-%
2-30 SOLUTION INTRAVENOUS
Status: DISCONTINUED | OUTPATIENT
Start: 2019-05-26 | End: 2019-05-27 | Stop reason: HOSPADM

## 2019-05-26 RX ORDER — CARBOPROST TROMETHAMINE 250 UG/ML
250 INJECTION, SOLUTION INTRAMUSCULAR AS NEEDED
Status: CANCELLED | OUTPATIENT
Start: 2019-05-26

## 2019-05-26 RX ORDER — SODIUM CHLORIDE 0.9 % (FLUSH) 0.9 %
3 SYRINGE (ML) INJECTION EVERY 12 HOURS SCHEDULED
Status: DISCONTINUED | OUTPATIENT
Start: 2019-05-26 | End: 2019-05-27 | Stop reason: HOSPADM

## 2019-05-26 RX ORDER — BUTORPHANOL TARTRATE 1 MG/ML
1 INJECTION, SOLUTION INTRAMUSCULAR; INTRAVENOUS
Status: DISCONTINUED | OUTPATIENT
Start: 2019-05-26 | End: 2019-05-27 | Stop reason: HOSPADM

## 2019-05-26 RX ORDER — SODIUM CHLORIDE, SODIUM LACTATE, POTASSIUM CHLORIDE, CALCIUM CHLORIDE 600; 310; 30; 20 MG/100ML; MG/100ML; MG/100ML; MG/100ML
125 INJECTION, SOLUTION INTRAVENOUS CONTINUOUS
Status: DISCONTINUED | OUTPATIENT
Start: 2019-05-26 | End: 2019-05-27

## 2019-05-26 RX ORDER — SODIUM CHLORIDE 0.9 % (FLUSH) 0.9 %
1-10 SYRINGE (ML) INJECTION AS NEEDED
Status: CANCELLED | OUTPATIENT
Start: 2019-05-26

## 2019-05-26 RX ORDER — LIDOCAINE HYDROCHLORIDE 10 MG/ML
5 INJECTION, SOLUTION EPIDURAL; INFILTRATION; INTRACAUDAL; PERINEURAL AS NEEDED
Status: CANCELLED | OUTPATIENT
Start: 2019-05-26

## 2019-05-26 RX ORDER — LIDOCAINE HYDROCHLORIDE 10 MG/ML
5 INJECTION, SOLUTION EPIDURAL; INFILTRATION; INTRACAUDAL; PERINEURAL AS NEEDED
Status: DISCONTINUED | OUTPATIENT
Start: 2019-05-26 | End: 2019-05-27 | Stop reason: HOSPADM

## 2019-05-26 RX ORDER — MISOPROSTOL 200 UG/1
800 TABLET ORAL AS NEEDED
Status: CANCELLED | OUTPATIENT
Start: 2019-05-26

## 2019-05-26 RX ORDER — OXYTOCIN-SODIUM CHLORIDE 0.9% IV SOLN 30 UNIT/500ML 30-0.9/5 UT/ML-%
650 SOLUTION INTRAVENOUS ONCE
Status: CANCELLED | OUTPATIENT
Start: 2019-05-26

## 2019-05-26 RX ORDER — MISOPROSTOL 100 MCG
25 TABLET ORAL ONCE
Status: CANCELLED | OUTPATIENT
Start: 2019-05-26 | End: 2019-05-26

## 2019-05-26 RX ORDER — PENICILLIN G 3000000 [IU]/50ML
3 INJECTION, SOLUTION INTRAVENOUS EVERY 4 HOURS
Status: DISCONTINUED | OUTPATIENT
Start: 2019-05-26 | End: 2019-05-27 | Stop reason: HOSPADM

## 2019-05-26 RX ORDER — SODIUM CHLORIDE, SODIUM LACTATE, POTASSIUM CHLORIDE, CALCIUM CHLORIDE 600; 310; 30; 20 MG/100ML; MG/100ML; MG/100ML; MG/100ML
125 INJECTION, SOLUTION INTRAVENOUS CONTINUOUS
Status: CANCELLED | OUTPATIENT
Start: 2019-05-26

## 2019-05-26 RX ORDER — MAGNESIUM CARB/ALUMINUM HYDROX 105-160MG
30 TABLET,CHEWABLE ORAL ONCE
Status: CANCELLED | OUTPATIENT
Start: 2019-05-26 | End: 2019-05-26

## 2019-05-26 RX ADMIN — BUTORPHANOL TARTRATE 1 MG: 1 INJECTION, SOLUTION INTRAMUSCULAR; INTRAVENOUS at 22:57

## 2019-05-26 RX ADMIN — SODIUM CHLORIDE, POTASSIUM CHLORIDE, SODIUM LACTATE AND CALCIUM CHLORIDE 125 ML/HR: 600; 310; 30; 20 INJECTION, SOLUTION INTRAVENOUS at 18:15

## 2019-05-26 RX ADMIN — MISOPROSTOL 25 MCG: 100 TABLET ORAL at 18:25

## 2019-05-26 RX ADMIN — PENICILLIN G 3 MILLION UNITS: 3000000 INJECTION, SOLUTION INTRAVENOUS at 22:56

## 2019-05-26 RX ADMIN — OXYTOCIN 2 MILLI-UNITS/MIN: 10 INJECTION, SOLUTION INTRAMUSCULAR; INTRAVENOUS at 23:00

## 2019-05-26 RX ADMIN — SODIUM CHLORIDE, POTASSIUM CHLORIDE, SODIUM LACTATE AND CALCIUM CHLORIDE 125 ML/HR: 600; 310; 30; 20 INJECTION, SOLUTION INTRAVENOUS at 22:55

## 2019-05-26 RX ADMIN — SODIUM CHLORIDE 5 MILLION UNITS: 900 INJECTION INTRAVENOUS at 18:25

## 2019-05-27 ENCOUNTER — ANESTHESIA (OUTPATIENT)
Dept: LABOR AND DELIVERY | Facility: HOSPITAL | Age: 29
End: 2019-05-27

## 2019-05-27 ENCOUNTER — ANESTHESIA EVENT (OUTPATIENT)
Dept: LABOR AND DELIVERY | Facility: HOSPITAL | Age: 29
End: 2019-05-27

## 2019-05-27 PROBLEM — Z34.90 PREGNANCY: Status: RESOLVED | Noted: 2019-05-26 | Resolved: 2019-05-27

## 2019-05-27 LAB
ABO GROUP BLD: NORMAL
FETAL BLEED: NEGATIVE
NUMBER OF DOSES: NORMAL
RH BLD: NEGATIVE

## 2019-05-27 PROCEDURE — 51702 INSERT TEMP BLADDER CATH: CPT

## 2019-05-27 PROCEDURE — 86901 BLOOD TYPING SEROLOGIC RH(D): CPT | Performed by: OBSTETRICS & GYNECOLOGY

## 2019-05-27 PROCEDURE — 86900 BLOOD TYPING SEROLOGIC ABO: CPT | Performed by: OBSTETRICS & GYNECOLOGY

## 2019-05-27 PROCEDURE — 25010000002 ONDANSETRON PER 1 MG: Performed by: ANESTHESIOLOGY

## 2019-05-27 PROCEDURE — 59025 FETAL NON-STRESS TEST: CPT

## 2019-05-27 PROCEDURE — 25010000002 PENICILLIN G POTASSIUM PER 600000 UNITS: Performed by: OBSTETRICS & GYNECOLOGY

## 2019-05-27 PROCEDURE — 0KQM0ZZ REPAIR PERINEUM MUSCLE, OPEN APPROACH: ICD-10-PCS | Performed by: OBSTETRICS & GYNECOLOGY

## 2019-05-27 PROCEDURE — C1755 CATHETER, INTRASPINAL: HCPCS

## 2019-05-27 PROCEDURE — 85461 HEMOGLOBIN FETAL: CPT | Performed by: OBSTETRICS & GYNECOLOGY

## 2019-05-27 PROCEDURE — 25010000002 BUTORPHANOL PER 1 MG: Performed by: OBSTETRICS & GYNECOLOGY

## 2019-05-27 PROCEDURE — C1755 CATHETER, INTRASPINAL: HCPCS | Performed by: ANESTHESIOLOGY

## 2019-05-27 PROCEDURE — 25010000002 ROPIVACAINE PER 1 MG: Performed by: ANESTHESIOLOGY

## 2019-05-27 PROCEDURE — 25010000002 FENTANYL CITRATE (PF) 100 MCG/2ML SOLUTION: Performed by: ANESTHESIOLOGY

## 2019-05-27 RX ORDER — SODIUM CHLORIDE 0.9 % (FLUSH) 0.9 %
1-10 SYRINGE (ML) INJECTION AS NEEDED
Status: DISCONTINUED | OUTPATIENT
Start: 2019-05-27 | End: 2019-05-29 | Stop reason: HOSPADM

## 2019-05-27 RX ORDER — METHYLERGONOVINE MALEATE 0.2 MG/ML
200 INJECTION INTRAVENOUS ONCE AS NEEDED
Status: DISCONTINUED | OUTPATIENT
Start: 2019-05-27 | End: 2019-05-27 | Stop reason: HOSPADM

## 2019-05-27 RX ORDER — DIPHENHYDRAMINE HYDROCHLORIDE 50 MG/ML
12.5 INJECTION INTRAMUSCULAR; INTRAVENOUS EVERY 8 HOURS PRN
Status: DISCONTINUED | OUTPATIENT
Start: 2019-05-27 | End: 2019-05-27 | Stop reason: HOSPADM

## 2019-05-27 RX ORDER — ROPIVACAINE HYDROCHLORIDE 2 MG/ML
12 INJECTION, SOLUTION EPIDURAL; INFILTRATION; PERINEURAL CONTINUOUS
Status: DISCONTINUED | OUTPATIENT
Start: 2019-05-27 | End: 2019-05-27

## 2019-05-27 RX ORDER — MISOPROSTOL 200 UG/1
800 TABLET ORAL AS NEEDED
Status: DISCONTINUED | OUTPATIENT
Start: 2019-05-27 | End: 2019-05-27 | Stop reason: HOSPADM

## 2019-05-27 RX ORDER — ONDANSETRON 2 MG/ML
4 INJECTION INTRAMUSCULAR; INTRAVENOUS ONCE AS NEEDED
Status: COMPLETED | OUTPATIENT
Start: 2019-05-27 | End: 2019-05-27

## 2019-05-27 RX ORDER — LIDOCAINE HYDROCHLORIDE AND EPINEPHRINE 20; 5 MG/ML; UG/ML
INJECTION, SOLUTION EPIDURAL; INFILTRATION; INTRACAUDAL; PERINEURAL AS NEEDED
Status: DISCONTINUED | OUTPATIENT
Start: 2019-05-27 | End: 2019-05-27 | Stop reason: SURG

## 2019-05-27 RX ORDER — OXYTOCIN-SODIUM CHLORIDE 0.9% IV SOLN 30 UNIT/500ML 30-0.9/5 UT/ML-%
650 SOLUTION INTRAVENOUS ONCE
Status: COMPLETED | OUTPATIENT
Start: 2019-05-27 | End: 2019-05-27

## 2019-05-27 RX ORDER — EPHEDRINE SULFATE/0.9% NACL/PF 25 MG/5 ML
10 SYRINGE (ML) INTRAVENOUS
Status: DISCONTINUED | OUTPATIENT
Start: 2019-05-27 | End: 2019-05-27 | Stop reason: HOSPADM

## 2019-05-27 RX ORDER — OXYTOCIN-SODIUM CHLORIDE 0.9% IV SOLN 30 UNIT/500ML 30-0.9/5 UT/ML-%
85 SOLUTION INTRAVENOUS ONCE
Status: DISCONTINUED | OUTPATIENT
Start: 2019-05-27 | End: 2019-05-27 | Stop reason: HOSPADM

## 2019-05-27 RX ORDER — DOCUSATE SODIUM 100 MG/1
100 CAPSULE, LIQUID FILLED ORAL 2 TIMES DAILY
Status: DISCONTINUED | OUTPATIENT
Start: 2019-05-27 | End: 2019-05-29 | Stop reason: HOSPADM

## 2019-05-27 RX ORDER — PROMETHAZINE HYDROCHLORIDE 25 MG/ML
12.5 INJECTION, SOLUTION INTRAMUSCULAR; INTRAVENOUS EVERY 4 HOURS PRN
Status: DISCONTINUED | OUTPATIENT
Start: 2019-05-27 | End: 2019-05-29 | Stop reason: HOSPADM

## 2019-05-27 RX ORDER — HYDROCODONE BITARTRATE AND ACETAMINOPHEN 5; 325 MG/1; MG/1
1 TABLET ORAL EVERY 4 HOURS PRN
Status: DISCONTINUED | OUTPATIENT
Start: 2019-05-27 | End: 2019-05-29 | Stop reason: HOSPADM

## 2019-05-27 RX ORDER — BISACODYL 10 MG
10 SUPPOSITORY, RECTAL RECTAL DAILY PRN
Status: DISCONTINUED | OUTPATIENT
Start: 2019-05-28 | End: 2019-05-29 | Stop reason: HOSPADM

## 2019-05-27 RX ORDER — FENTANYL CITRATE 50 UG/ML
INJECTION, SOLUTION INTRAMUSCULAR; INTRAVENOUS AS NEEDED
Status: DISCONTINUED | OUTPATIENT
Start: 2019-05-27 | End: 2019-05-27 | Stop reason: SURG

## 2019-05-27 RX ORDER — METOCLOPRAMIDE HYDROCHLORIDE 5 MG/ML
10 INJECTION INTRAMUSCULAR; INTRAVENOUS ONCE AS NEEDED
Status: DISCONTINUED | OUTPATIENT
Start: 2019-05-27 | End: 2019-05-27 | Stop reason: HOSPADM

## 2019-05-27 RX ORDER — LIDOCAINE HYDROCHLORIDE AND EPINEPHRINE 15; 5 MG/ML; UG/ML
INJECTION, SOLUTION EPIDURAL AS NEEDED
Status: DISCONTINUED | OUTPATIENT
Start: 2019-05-27 | End: 2019-05-27 | Stop reason: SURG

## 2019-05-27 RX ORDER — TRISODIUM CITRATE DIHYDRATE AND CITRIC ACID MONOHYDRATE 500; 334 MG/5ML; MG/5ML
30 SOLUTION ORAL ONCE
Status: DISCONTINUED | OUTPATIENT
Start: 2019-05-27 | End: 2019-05-27 | Stop reason: HOSPADM

## 2019-05-27 RX ORDER — CARBOPROST TROMETHAMINE 250 UG/ML
250 INJECTION, SOLUTION INTRAMUSCULAR AS NEEDED
Status: DISCONTINUED | OUTPATIENT
Start: 2019-05-27 | End: 2019-05-27 | Stop reason: HOSPADM

## 2019-05-27 RX ORDER — LANOLIN 100 %
OINTMENT (GRAM) TOPICAL
Status: DISCONTINUED | OUTPATIENT
Start: 2019-05-27 | End: 2019-05-29 | Stop reason: HOSPADM

## 2019-05-27 RX ORDER — IBUPROFEN 600 MG/1
600 TABLET ORAL EVERY 6 HOURS PRN
Status: DISCONTINUED | OUTPATIENT
Start: 2019-05-27 | End: 2019-05-29 | Stop reason: HOSPADM

## 2019-05-27 RX ORDER — ONDANSETRON 2 MG/ML
4 INJECTION INTRAMUSCULAR; INTRAVENOUS EVERY 6 HOURS PRN
Status: DISCONTINUED | OUTPATIENT
Start: 2019-05-27 | End: 2019-05-29 | Stop reason: HOSPADM

## 2019-05-27 RX ORDER — PRENATAL VIT/IRON FUM/FOLIC AC 27MG-0.8MG
1 TABLET ORAL DAILY
Status: DISCONTINUED | OUTPATIENT
Start: 2019-05-28 | End: 2019-05-29 | Stop reason: HOSPADM

## 2019-05-27 RX ORDER — ONDANSETRON 4 MG/1
4 TABLET, FILM COATED ORAL EVERY 6 HOURS PRN
Status: DISCONTINUED | OUTPATIENT
Start: 2019-05-27 | End: 2019-05-29 | Stop reason: HOSPADM

## 2019-05-27 RX ORDER — PROMETHAZINE HYDROCHLORIDE 12.5 MG/1
12.5 TABLET ORAL EVERY 4 HOURS PRN
Status: DISCONTINUED | OUTPATIENT
Start: 2019-05-27 | End: 2019-05-29 | Stop reason: HOSPADM

## 2019-05-27 RX ORDER — OXYCODONE HYDROCHLORIDE AND ACETAMINOPHEN 5; 325 MG/1; MG/1
1 TABLET ORAL EVERY 4 HOURS PRN
Status: DISCONTINUED | OUTPATIENT
Start: 2019-05-27 | End: 2019-05-29 | Stop reason: HOSPADM

## 2019-05-27 RX ADMIN — LIDOCAINE HYDROCHLORIDE AND EPINEPHRINE 2 ML: 15; 5 INJECTION, SOLUTION EPIDURAL at 03:47

## 2019-05-27 RX ADMIN — LIDOCAINE HYDROCHLORIDE,EPINEPHRINE BITARTRATE 5 ML: 20; .005 INJECTION, SOLUTION EPIDURAL; INFILTRATION; INTRACAUDAL; PERINEURAL at 03:44

## 2019-05-27 RX ADMIN — SODIUM CHLORIDE, POTASSIUM CHLORIDE, SODIUM LACTATE AND CALCIUM CHLORIDE 125 ML/HR: 600; 310; 30; 20 INJECTION, SOLUTION INTRAVENOUS at 15:10

## 2019-05-27 RX ADMIN — Medication: at 22:36

## 2019-05-27 RX ADMIN — SODIUM CHLORIDE, POTASSIUM CHLORIDE, SODIUM LACTATE AND CALCIUM CHLORIDE 1000 ML: 600; 310; 30; 20 INJECTION, SOLUTION INTRAVENOUS at 03:24

## 2019-05-27 RX ADMIN — ROPIVACAINE HYDROCHLORIDE 14 ML/HR: 2 INJECTION, SOLUTION EPIDURAL; INFILTRATION at 15:56

## 2019-05-27 RX ADMIN — BUTORPHANOL TARTRATE 1 MG: 1 INJECTION, SOLUTION INTRAMUSCULAR; INTRAVENOUS at 00:58

## 2019-05-27 RX ADMIN — SODIUM CHLORIDE, POTASSIUM CHLORIDE, SODIUM LACTATE AND CALCIUM CHLORIDE 125 ML/HR: 600; 310; 30; 20 INJECTION, SOLUTION INTRAVENOUS at 08:12

## 2019-05-27 RX ADMIN — OXYCODONE HYDROCHLORIDE AND ACETAMINOPHEN 1 TABLET: 5; 325 TABLET ORAL at 18:53

## 2019-05-27 RX ADMIN — ROPIVACAINE HYDROCHLORIDE 14 ML/HR: 2 INJECTION, SOLUTION EPIDURAL; INFILTRATION at 03:51

## 2019-05-27 RX ADMIN — PENICILLIN G 3 MILLION UNITS: 3000000 INJECTION, SOLUTION INTRAVENOUS at 06:03

## 2019-05-27 RX ADMIN — OXYTOCIN 650 ML/HR: 10 INJECTION INTRAVENOUS at 18:32

## 2019-05-27 RX ADMIN — Medication 10 MG: at 06:03

## 2019-05-27 RX ADMIN — PENICILLIN G 3 MILLION UNITS: 3000000 INJECTION, SOLUTION INTRAVENOUS at 02:53

## 2019-05-27 RX ADMIN — PENICILLIN G 3 MILLION UNITS: 3000000 INJECTION, SOLUTION INTRAVENOUS at 11:28

## 2019-05-27 RX ADMIN — ONDANSETRON 4 MG: 2 INJECTION INTRAMUSCULAR; INTRAVENOUS at 16:20

## 2019-05-27 RX ADMIN — WITCH HAZEL 1 PAD: 500 SOLUTION RECTAL; TOPICAL at 22:37

## 2019-05-27 RX ADMIN — DOCUSATE SODIUM 100 MG: 100 CAPSULE, LIQUID FILLED ORAL at 22:36

## 2019-05-27 RX ADMIN — PENICILLIN G 3 MILLION UNITS: 3000000 INJECTION, SOLUTION INTRAVENOUS at 15:46

## 2019-05-27 RX ADMIN — FENTANYL CITRATE 100 MCG: 50 INJECTION, SOLUTION INTRAMUSCULAR; INTRAVENOUS at 03:51

## 2019-05-27 RX ADMIN — SODIUM CHLORIDE, POTASSIUM CHLORIDE, SODIUM LACTATE AND CALCIUM CHLORIDE 300 ML: 600; 310; 30; 20 INJECTION, SOLUTION INTRAVENOUS at 13:32

## 2019-05-27 RX ADMIN — LIDOCAINE HYDROCHLORIDE AND EPINEPHRINE 3 ML: 15; 5 INJECTION, SOLUTION EPIDURAL at 03:45

## 2019-05-27 NOTE — ANESTHESIA PREPROCEDURE EVALUATION
Anesthesia Evaluation     Patient summary reviewed and Nursing notes reviewed   NPO Solid Status: > 6 hours  NPO Liquid Status: < 2 hours           Airway   Mallampati: II  TM distance: >3 FB  Neck ROM: full  No difficulty expected  Dental      Pulmonary - negative pulmonary ROS   Cardiovascular - negative cardio ROS        Neuro/Psych- negative ROS  GI/Hepatic/Renal/Endo    (+)   hypothyroidism,     Musculoskeletal (-) negative ROS    Abdominal    Substance History - negative use     OB/GYN    (+) Pregnant,         Other                        Anesthesia Plan    ASA 2     epidural     Anesthetic plan, all risks, benefits, and alternatives have been provided, discussed and informed consent has been obtained with: patient.  Use of blood products discussed with patient .

## 2019-05-27 NOTE — ANESTHESIA PROCEDURE NOTES
Labor Epidural      Patient reassessed immediately prior to procedure    Patient location during procedure: OB  Performed By  Anesthesiologist: Kaushik Darnell DO  Preanesthetic Checklist  Completed: patient identified, site marked, surgical consent, pre-op evaluation, timeout performed, IV checked, risks and benefits discussed and monitors and equipment checked  Prep:  Pt Position:sitting  Sterile Tech:gloves, mask, sterile barrier and cap  Prep:chlorhexidine gluconate and isopropyl alcohol  Monitoring:blood pressure monitoring and continuous pulse oximetry  Epidural Block Procedure:  Approach:midline  Guidance:landmark technique and palpation technique  Location:L3-L4  Needle Type:Tuohy  Needle Gauge:17 G  Loss of Resistance Medium: air  Loss of Resistance: 5cm  Cath Depth at skin:10 cm  Paresthesia: none  Aspiration:negative  Test Dose:negative  Number of Attempts: 1  Post Assessment:  Dressing:secured with tape and occlusive dressing applied (Tegaderm Placed)  Pt Tolerance:patient tolerated the procedure well with no apparent complications  Complications:no

## 2019-05-28 LAB
BASOPHILS # BLD AUTO: 0.02 10*3/MM3 (ref 0–0.2)
BASOPHILS NFR BLD AUTO: 0.1 % (ref 0–1.5)
DEPRECATED RDW RBC AUTO: 44.7 FL (ref 37–54)
EOSINOPHIL # BLD AUTO: 0.02 10*3/MM3 (ref 0–0.4)
EOSINOPHIL NFR BLD AUTO: 0.1 % (ref 0.3–6.2)
ERYTHROCYTE [DISTWIDTH] IN BLOOD BY AUTOMATED COUNT: 14.2 % (ref 12.3–15.4)
HCT VFR BLD AUTO: 30.4 % (ref 34–46.6)
HGB BLD-MCNC: 9.9 G/DL (ref 12–15.9)
IMM GRANULOCYTES # BLD AUTO: 0.14 10*3/MM3 (ref 0–0.05)
IMM GRANULOCYTES NFR BLD AUTO: 0.5 % (ref 0–0.5)
LYMPHOCYTES # BLD AUTO: 2.12 10*3/MM3 (ref 0.7–3.1)
LYMPHOCYTES NFR BLD AUTO: 8.1 % (ref 19.6–45.3)
MCH RBC QN AUTO: 28.3 PG (ref 26.6–33)
MCHC RBC AUTO-ENTMCNC: 32.6 G/DL (ref 31.5–35.7)
MCV RBC AUTO: 86.9 FL (ref 79–97)
MONOCYTES # BLD AUTO: 0.96 10*3/MM3 (ref 0.1–0.9)
MONOCYTES NFR BLD AUTO: 3.7 % (ref 5–12)
NEUTROPHILS # BLD AUTO: 23 10*3/MM3 (ref 1.7–7)
NEUTROPHILS NFR BLD AUTO: 88 % (ref 42.7–76)
PLATELET # BLD AUTO: 303 10*3/MM3 (ref 140–450)
PMV BLD AUTO: 11.8 FL (ref 6–12)
RBC # BLD AUTO: 3.5 10*6/MM3 (ref 3.77–5.28)
WBC NRBC COR # BLD: 26.12 10*3/MM3 (ref 3.4–10.8)

## 2019-05-28 PROCEDURE — 85025 COMPLETE CBC W/AUTO DIFF WBC: CPT | Performed by: OBSTETRICS & GYNECOLOGY

## 2019-05-28 PROCEDURE — 25010000002 RHO D IMMUNE GLOBULIN 1500 UNIT/2ML SOLUTION PREFILLED SYRINGE: Performed by: OBSTETRICS & GYNECOLOGY

## 2019-05-28 RX ADMIN — DOCUSATE SODIUM 100 MG: 100 CAPSULE, LIQUID FILLED ORAL at 08:59

## 2019-05-28 RX ADMIN — DOCUSATE SODIUM 100 MG: 100 CAPSULE, LIQUID FILLED ORAL at 21:05

## 2019-05-28 RX ADMIN — LEVOTHYROXINE SODIUM 125 MCG: 25 TABLET ORAL at 07:04

## 2019-05-28 RX ADMIN — HUMAN RHO(D) IMMUNE GLOBULIN 1500 UNITS: 1500 SOLUTION INTRAMUSCULAR; INTRAVENOUS at 01:15

## 2019-05-28 RX ADMIN — IBUPROFEN 600 MG: 600 TABLET ORAL at 14:40

## 2019-05-28 RX ADMIN — IBUPROFEN 600 MG: 600 TABLET ORAL at 21:05

## 2019-05-28 RX ADMIN — IBUPROFEN 600 MG: 600 TABLET ORAL at 07:04

## 2019-05-28 RX ADMIN — PRENATAL VIT W/ FE FUMARATE-FA TAB 27-0.8 MG 1 TABLET: 27-0.8 TAB at 08:59

## 2019-05-28 RX ADMIN — IBUPROFEN 600 MG: 600 TABLET ORAL at 01:15

## 2019-05-28 NOTE — ANESTHESIA POSTPROCEDURE EVALUATION
Patient: Olga Osborne    Procedure Summary     Date:  05/27/19 Room / Location:      Anesthesia Start:  0327 Anesthesia Stop:  1803    Procedure:  LABOR ANALGESIA Diagnosis:      Scheduled Providers:   Provider:  Jovanna Pavon DO    Anesthesia Type:  epidural ASA Status:  2          Anesthesia Type: epidural  Last vitals  BP   135/76(Nurse notified.) (05/28/19 0730)   Temp   98.8 °F (37.1 °C) (05/28/19 0730)   Pulse   82 (05/28/19 0730)   Resp   16 (05/28/19 0730)     SpO2   100 % (05/27/19 0346)     Post Anesthesia Care and Evaluation    Patient location during evaluation: bedside  Patient participation: complete - patient participated  Level of consciousness: awake and alert  Pain management: adequate  Airway patency: patent  Anesthetic complications: No anesthetic complications    Cardiovascular status: acceptable  Respiratory status: acceptable  Hydration status: acceptable  Post Neuraxial Block status: No signs or symptoms of PDPH  Comments: Some residual tingling in right foot, it does not interfere with ambulation. Will reevaluate tomorrow for signs of improvement

## 2019-05-29 VITALS
TEMPERATURE: 97.7 F | RESPIRATION RATE: 15 BRPM | WEIGHT: 202 LBS | HEIGHT: 63 IN | BODY MASS INDEX: 35.79 KG/M2 | HEART RATE: 81 BPM | OXYGEN SATURATION: 100 % | DIASTOLIC BLOOD PRESSURE: 79 MMHG | SYSTOLIC BLOOD PRESSURE: 136 MMHG

## 2019-05-29 LAB
BASOPHILS # BLD AUTO: 0.04 10*3/MM3 (ref 0–0.2)
BASOPHILS NFR BLD AUTO: 0.2 % (ref 0–1.5)
DEPRECATED RDW RBC AUTO: 45 FL (ref 37–54)
EOSINOPHIL # BLD AUTO: 0.15 10*3/MM3 (ref 0–0.4)
EOSINOPHIL NFR BLD AUTO: 0.6 % (ref 0.3–6.2)
ERYTHROCYTE [DISTWIDTH] IN BLOOD BY AUTOMATED COUNT: 14.2 % (ref 12.3–15.4)
HCT VFR BLD AUTO: 29.8 % (ref 34–46.6)
HGB BLD-MCNC: 9.8 G/DL (ref 12–15.9)
IMM GRANULOCYTES # BLD AUTO: 0.11 10*3/MM3 (ref 0–0.05)
IMM GRANULOCYTES NFR BLD AUTO: 0.5 % (ref 0–0.5)
LYMPHOCYTES # BLD AUTO: 2.74 10*3/MM3 (ref 0.7–3.1)
LYMPHOCYTES NFR BLD AUTO: 11.8 % (ref 19.6–45.3)
MCH RBC QN AUTO: 28.7 PG (ref 26.6–33)
MCHC RBC AUTO-ENTMCNC: 32.9 G/DL (ref 31.5–35.7)
MCV RBC AUTO: 87.1 FL (ref 79–97)
MONOCYTES # BLD AUTO: 1.08 10*3/MM3 (ref 0.1–0.9)
MONOCYTES NFR BLD AUTO: 4.6 % (ref 5–12)
NEUTROPHILS # BLD AUTO: 19.12 10*3/MM3 (ref 1.7–7)
NEUTROPHILS NFR BLD AUTO: 82.3 % (ref 42.7–76)
PLATELET # BLD AUTO: 338 10*3/MM3 (ref 140–450)
PMV BLD AUTO: 10.9 FL (ref 6–12)
RBC # BLD AUTO: 3.42 10*6/MM3 (ref 3.77–5.28)
WBC NRBC COR # BLD: 23.24 10*3/MM3 (ref 3.4–10.8)

## 2019-05-29 PROCEDURE — 85025 COMPLETE CBC W/AUTO DIFF WBC: CPT | Performed by: NURSE PRACTITIONER

## 2019-05-29 RX ADMIN — PRENATAL VIT W/ FE FUMARATE-FA TAB 27-0.8 MG 1 TABLET: 27-0.8 TAB at 08:47

## 2019-05-29 RX ADMIN — LEVOTHYROXINE SODIUM 125 MCG: 25 TABLET ORAL at 06:22

## 2019-05-29 RX ADMIN — DOCUSATE SODIUM 100 MG: 100 CAPSULE, LIQUID FILLED ORAL at 08:47

## 2019-05-29 RX ADMIN — IBUPROFEN 600 MG: 600 TABLET ORAL at 04:22

## 2020-09-29 ENCOUNTER — TELEMEDICINE (OUTPATIENT)
Dept: FAMILY MEDICINE CLINIC | Facility: CLINIC | Age: 30
End: 2020-09-29

## 2020-09-29 DIAGNOSIS — R05.9 COUGH: Primary | ICD-10-CM

## 2020-09-29 PROCEDURE — 99213 OFFICE O/P EST LOW 20 MIN: CPT | Performed by: PHYSICIAN ASSISTANT

## 2020-09-29 RX ORDER — BENZONATATE 100 MG/1
100 CAPSULE ORAL 2 TIMES DAILY
Qty: 20 CAPSULE | Refills: 0 | Status: SHIPPED | OUTPATIENT
Start: 2020-09-29 | End: 2022-03-08

## 2020-09-29 NOTE — PROGRESS NOTES
Subjective   Olga Osborne is a 29 y.o. female  Cough  Video visit    History of Present Illness  Patient is 29-year-old white female who presents for a video visit complaining of cough nonproductive scratchy irritated throat pain with swallowing no fever no chills, cough is nonproductive scratchy getting worse the last couple weeks no fever no chills no nausea vomiting no heartburn symptoms  The following portions of the patient's history were reviewed and updated as appropriate: allergies, current medications, past social history and problem list    Review of Systems   Constitutional: Negative for appetite change, diaphoresis, fatigue and unexpected weight change.   Eyes: Negative for visual disturbance.   Respiratory: Positive for cough. Negative for chest tightness and shortness of breath.    Cardiovascular: Negative for chest pain, palpitations and leg swelling.   Gastrointestinal: Negative for diarrhea, nausea and vomiting.   Endocrine: Negative for polydipsia, polyphagia and polyuria.   Skin: Negative for color change and rash.   Neurological: Negative for dizziness, syncope, weakness, light-headedness, numbness and headaches.       Objective     There were no vitals filed for this visit.    Physical Exam  Constitutional:       Appearance: Normal appearance. She is well-developed and normal weight.   Neck:      Vascular: No JVD.   Neurological:      Mental Status: She is alert.   Psychiatric:         Mood and Affect: Mood normal.         Behavior: Behavior normal.         Thought Content: Thought content normal.         Judgment: Judgment normal.         Assessment/Plan     Olga was seen today for cough.    Diagnoses and all orders for this visit:    Cough  -     benzonatate (Tessalon Perles) 100 MG capsule; Take 1 capsule by mouth 2 (Two) Times a Day.    Follow-up as needed spent 16 minutes discussed ways improve symptoms Tylenol along with Tessalon Perles for cough talked about how to decrease symptoms

## 2020-10-03 ENCOUNTER — FLU SHOT (OUTPATIENT)
Dept: INTERNAL MEDICINE | Facility: CLINIC | Age: 30
End: 2020-10-03

## 2020-10-03 DIAGNOSIS — Z23 NEED FOR INFLUENZA VACCINATION: ICD-10-CM

## 2020-10-03 PROCEDURE — 90471 IMMUNIZATION ADMIN: CPT | Performed by: INTERNAL MEDICINE

## 2020-10-03 PROCEDURE — 90686 IIV4 VACC NO PRSV 0.5 ML IM: CPT | Performed by: INTERNAL MEDICINE

## 2020-10-05 ENCOUNTER — TELEPHONE (OUTPATIENT)
Dept: FAMILY MEDICINE CLINIC | Facility: CLINIC | Age: 30
End: 2020-10-05

## 2020-10-05 DIAGNOSIS — R05.9 COUGH: Primary | ICD-10-CM

## 2020-10-05 RX ORDER — PROMETHAZINE HYDROCHLORIDE AND CODEINE PHOSPHATE 6.25; 1 MG/5ML; MG/5ML
5 SYRUP ORAL EVERY 4 HOURS PRN
Qty: 180 ML | Refills: 0 | Status: SHIPPED | OUTPATIENT
Start: 2020-10-05 | End: 2022-03-08

## 2020-10-05 NOTE — TELEPHONE ENCOUNTER
PT HAD AN APPOINTMENT ON 9/29 FOR A COUGH    PT STATED COUGH HAS WORSENED AND MEDICATION IS NOT WORKING    PT IS REQUESTING A CHEST XRAY    PLEASE ADVISE At: [phone]

## 2022-03-08 ENCOUNTER — OFFICE VISIT (OUTPATIENT)
Dept: FAMILY MEDICINE CLINIC | Facility: CLINIC | Age: 32
End: 2022-03-08

## 2022-03-08 VITALS
SYSTOLIC BLOOD PRESSURE: 128 MMHG | OXYGEN SATURATION: 95 % | TEMPERATURE: 97.1 F | BODY MASS INDEX: 38.16 KG/M2 | WEIGHT: 215.4 LBS | DIASTOLIC BLOOD PRESSURE: 86 MMHG | RESPIRATION RATE: 14 BRPM | HEART RATE: 77 BPM | HEIGHT: 63 IN

## 2022-03-08 DIAGNOSIS — J40 BRONCHITIS: Primary | ICD-10-CM

## 2022-03-08 PROCEDURE — 99213 OFFICE O/P EST LOW 20 MIN: CPT | Performed by: PHYSICIAN ASSISTANT

## 2022-03-08 RX ORDER — DEXTROMETHORPHAN HYDROBROMIDE AND PROMETHAZINE HYDROCHLORIDE 15; 6.25 MG/5ML; MG/5ML
2.5 SYRUP ORAL 4 TIMES DAILY PRN
Qty: 120 ML | Refills: 0 | Status: SHIPPED | OUTPATIENT
Start: 2022-03-08 | End: 2022-04-11

## 2022-03-08 RX ORDER — LEVOTHYROXINE SODIUM 0.15 MG/1
175 TABLET ORAL DAILY
COMMUNITY
End: 2023-01-11

## 2022-03-08 RX ORDER — PREDNISONE 20 MG/1
20 TABLET ORAL 2 TIMES DAILY
Qty: 14 TABLET | Refills: 0 | Status: SHIPPED | OUTPATIENT
Start: 2022-03-08 | End: 2022-04-11

## 2022-03-08 RX ORDER — AZITHROMYCIN 250 MG/1
TABLET, FILM COATED ORAL
Qty: 6 TABLET | Refills: 1 | Status: SHIPPED | OUTPATIENT
Start: 2022-03-08 | End: 2022-04-11

## 2022-03-08 NOTE — PROGRESS NOTES
Subjective   Olga Osborne is a 31 y.o. female  Cough (X1 month, started as laryngitis but not productive cough)      History of Present Illness    The following portions of the patient's history were reviewed and updated as appropriate: allergies, current medications, past social history and problem list    Review of Systems    Objective     Vitals:    03/08/22 1610   BP: 128/86   Pulse: 77   Resp: 14   Temp: 97.1 °F (36.2 °C)   SpO2: 95%       Physical Exam    Assessment/Plan     Diagnoses and all orders for this visit:    1. Bronchitis (Primary)  -     azithromycin (Zithromax Z-Josiah) 250 MG tablet; Take 2 tablets the first day, then 1 tablet daily for 4 days.  Dispense: 6 tablet; Refill: 1  -     predniSONE (DELTASONE) 20 MG tablet; Take 1 tablet by mouth 2 (Two) Times a Day.  Dispense: 14 tablet; Refill: 0  -     promethazine-dextromethorphan (PROMETHAZINE-DM) 6.25-15 MG/5ML syrup; Take 2.5 mL by mouth 4 (Four) Times a Day As Needed for Cough.  Dispense: 120 mL; Refill: 0

## 2022-03-08 NOTE — PROGRESS NOTES
Subjective   Olga Osborne is a 31 y.o. female  Cough (X1 month, started as laryngitis but not productive cough)      History of Present Illness  Patient is a pleasant 31-year-old white female who comes in plan of sinus for sinus congestion cough is been productive with yellow sputum cough on and on x1 month but symptoms of sputum production has been present for the last month no shortness breath no chest pain  The following portions of the patient's history were reviewed and updated as appropriate: allergies, current medications, past social history and problem list    Review of Systems   Constitutional: Negative for chills, fatigue and fever.   HENT: Negative.    Respiratory: Positive for cough, chest tightness and wheezing. Negative for shortness of breath.    Cardiovascular: Negative for chest pain.   Gastrointestinal: Negative for nausea.       Objective     Vitals:    03/08/22 1610   BP: 128/86   Pulse: 77   Resp: 14   Temp: 97.1 °F (36.2 °C)   SpO2: 95%       Physical Exam  Vitals and nursing note reviewed.   Constitutional:       General: She is not in acute distress.     Appearance: She is well-developed. She is not diaphoretic.   HENT:      Head: Normocephalic and atraumatic.      Nose: Nose normal.   Neck:      Vascular: No JVD.   Cardiovascular:      Rate and Rhythm: Normal rate and regular rhythm.      Heart sounds: Normal heart sounds. No murmur heard.  Pulmonary:      Effort: Pulmonary effort is normal. No respiratory distress.      Breath sounds: No stridor. Wheezing present.   Musculoskeletal:      Cervical back: Neck supple.   Lymphadenopathy:      Cervical: No cervical adenopathy.         Assessment/Plan     Diagnoses and all orders for this visit:    1. Bronchitis (Primary)  -     azithromycin (Zithromax Z-Josiah) 250 MG tablet; Take 2 tablets the first day, then 1 tablet daily for 4 days.  Dispense: 6 tablet; Refill: 1  -     predniSONE (DELTASONE) 20 MG tablet; Take 1 tablet by mouth 2 (Two)  Times a Day.  Dispense: 14 tablet; Refill: 0  -     promethazine-dextromethorphan (PROMETHAZINE-DM) 6.25-15 MG/5ML syrup; Take 2.5 mL by mouth 4 (Four) Times a Day As Needed for Cough.  Dispense: 120 mL; Refill: 0     I spent 15 minutes in patient care: Reviewing records prior to the visit, examining the patient, entering orders and documentation    Part of this note may be an electronic transcription/translation of spoken language to printed text using the Dragon Dictation System.

## 2022-03-17 ENCOUNTER — TELEPHONE (OUTPATIENT)
Dept: FAMILY MEDICINE CLINIC | Facility: CLINIC | Age: 32
End: 2022-03-17

## 2022-03-17 RX ORDER — NITROFURANTOIN 25; 75 MG/1; MG/1
100 CAPSULE ORAL 2 TIMES DAILY
Qty: 14 CAPSULE | Refills: 0 | Status: SHIPPED | OUTPATIENT
Start: 2022-03-17 | End: 2022-04-11

## 2022-03-17 NOTE — TELEPHONE ENCOUNTER
PT CALLED STATED THAT SHE BELIEVES THAT THE ANTIBIOTIC THAT WAS PRESCRIBED TO HER HAS CAUSED HER TO HAVE A BLADDER INFECTION AND WOULD LIKE TO KNOW IF DR PREFER FOR HER TO COME IN FOR APPT OR IF RX CAN BE PRESCRIBED.  PT SYMPTOMS ARE PAIN AND DISCOMFORT, FREQUENT URINE.    PLEASE ADVISE.  CALL BACK:[phone]

## 2022-03-17 NOTE — TELEPHONE ENCOUNTER
I spoke with patient and she was prescribed a Zpak. She does think she has a UTI-not a yeast infection.    She c/o low back pain, urgency, oliguria. EBONIE Srivastava

## 2022-04-08 ENCOUNTER — TELEPHONE (OUTPATIENT)
Dept: OBSTETRICS AND GYNECOLOGY | Facility: CLINIC | Age: 32
End: 2022-04-08

## 2022-04-08 DIAGNOSIS — N93.9 EPISODE OF HEAVY VAGINAL BLEEDING: Primary | ICD-10-CM

## 2022-04-08 NOTE — TELEPHONE ENCOUNTER
Patient called stating she has been bleeding for 16 days. She states it acts like it'll be about time to stop, then all of a sudden gushes of blood w/ clots. Wants to know if she can be seen. Please advise.

## 2022-04-08 NOTE — TELEPHONE ENCOUNTER
Dr. Malone pt.   LMP: 3/23/2022 and still currently bleeding.     S/w pt. She complains of having constant vaginal bleeding since started her menses on 3/23 and having blood clots that are not bigger than a golf ball size. States blood clots are about 1/2 dollar coin in size and yesterday she did saturate one tampon and pad <1 hour. I advised patient to come into the office for u/s and evaluation with Dr. Malone . She states she is going out of town next week on Wednesday and would like to have an appt if possible before leaving town. Patient has been scheduled for Monday U/S and appt afterwards.     Advised patient if she started to have severe pelvic pain or if her s/s worsened to go to nearest ED. She v/u.     U/s order has been placed.

## 2022-04-11 ENCOUNTER — OFFICE VISIT (OUTPATIENT)
Dept: OBSTETRICS AND GYNECOLOGY | Facility: CLINIC | Age: 32
End: 2022-04-11

## 2022-04-11 VITALS
HEIGHT: 63 IN | BODY MASS INDEX: 38.09 KG/M2 | WEIGHT: 215 LBS | SYSTOLIC BLOOD PRESSURE: 130 MMHG | DIASTOLIC BLOOD PRESSURE: 90 MMHG

## 2022-04-11 DIAGNOSIS — N83.201 CYST OF RIGHT OVARY: ICD-10-CM

## 2022-04-11 DIAGNOSIS — N93.9 ABNORMAL UTERINE BLEEDING (AUB): Primary | ICD-10-CM

## 2022-04-11 LAB
B-HCG UR QL: NEGATIVE
BASOPHILS # BLD AUTO: 0.07 10*3/MM3 (ref 0–0.2)
BASOPHILS NFR BLD AUTO: 1.2 % (ref 0–1.5)
EOSINOPHIL # BLD AUTO: 0.18 10*3/MM3 (ref 0–0.4)
EOSINOPHIL NFR BLD AUTO: 3 % (ref 0.3–6.2)
ERYTHROCYTE [DISTWIDTH] IN BLOOD BY AUTOMATED COUNT: 13.2 % (ref 12.3–15.4)
EXPIRATION DATE: NORMAL
HCT VFR BLD AUTO: 36.3 % (ref 34–46.6)
HGB BLD-MCNC: 12.1 G/DL (ref 12–15.9)
IMM GRANULOCYTES # BLD AUTO: 0.01 10*3/MM3 (ref 0–0.05)
IMM GRANULOCYTES NFR BLD AUTO: 0.2 % (ref 0–0.5)
INTERNAL NEGATIVE CONTROL: NORMAL
INTERNAL POSITIVE CONTROL: NORMAL
LYMPHOCYTES # BLD AUTO: 1.32 10*3/MM3 (ref 0.7–3.1)
LYMPHOCYTES NFR BLD AUTO: 21.9 % (ref 19.6–45.3)
Lab: NORMAL
MCH RBC QN AUTO: 30.6 PG (ref 26.6–33)
MCHC RBC AUTO-ENTMCNC: 33.3 G/DL (ref 31.5–35.7)
MCV RBC AUTO: 91.7 FL (ref 79–97)
MONOCYTES # BLD AUTO: 0.35 10*3/MM3 (ref 0.1–0.9)
MONOCYTES NFR BLD AUTO: 5.8 % (ref 5–12)
NEUTROPHILS # BLD AUTO: 4.11 10*3/MM3 (ref 1.7–7)
NEUTROPHILS NFR BLD AUTO: 67.9 % (ref 42.7–76)
NRBC BLD AUTO-RTO: 0 /100 WBC (ref 0–0.2)
PLATELET # BLD AUTO: 353 10*3/MM3 (ref 140–450)
RBC # BLD AUTO: 3.96 10*6/MM3 (ref 3.77–5.28)
TSH SERPL DL<=0.005 MIU/L-ACNC: ABNORMAL UIU/ML (ref 0.27–4.2)
WBC # BLD AUTO: 6.04 10*3/MM3 (ref 3.4–10.8)

## 2022-04-11 PROCEDURE — 99214 OFFICE O/P EST MOD 30 MIN: CPT | Performed by: OBSTETRICS & GYNECOLOGY

## 2022-04-11 PROCEDURE — 81025 URINE PREGNANCY TEST: CPT | Performed by: OBSTETRICS & GYNECOLOGY

## 2022-04-11 PROCEDURE — 58100 BIOPSY OF UTERUS LINING: CPT | Performed by: OBSTETRICS & GYNECOLOGY

## 2022-04-11 NOTE — PROGRESS NOTES
Chief Complaint   Patient presents with   • Menstrual Problem            HPI  Olga Osborne is a 31 y.o. female, , who presents with initial evaluation of Abnormal Uterine Bleeding and Menorrhagia      Her last LMP was Patient's last menstrual period was 2022.  Periods prior were regular every 25-35 days, lasting 5-6 days. The patient uses 1 of tampons/pads per hour. Her periods have been increasing in flow over the fast 6 months    She states she started her menses 3/23/2022 and has continued bleeding since. The flow is varying, at times she can saturate > 1 pad or tampon per hour, other times she will have spotting and think it is about to stop. She reports heavier bleeding tends to be in the afternoon hours. She is currently spotting. She is on day 19 of bleeding.The patient reports additional symptoms as faitgue.  The patient has been evaluated: No.  In the past the patient has tried motrin.    Her PMH is significant for hypothyroidism. She is on levothyroxine 150mcg. Her TSH was last checked in 2020.     Did the patient have u/s today? Yes.  Findings showed several nabothian cysts and small cystic areas noted in endometrium with emc of 14.4mm. 2 right ovarian cysts measuring 32 x 17 x 27mm and a 18 x 13x 12mm daughter cyst.  I have personally evaluated the U/S and agree with the findings. Martina Malone MD    Additional OB/GYN History   Last Pap : 2019- with [facility]  Last Completed Pap Smear     This patient has no relevant Health Maintenance data.        History of abnormal Pap smear: no  Tobacco Usage?: No     The additional following portions of the patient's history were reviewed and updated as appropriate: allergies, current medications, past family history, past medical history, past social history, past surgical history and problem list.    Review of Systems   Constitutional: Positive for fatigue.   HENT: Negative.    Eyes: Negative.    Respiratory: Negative.    Cardiovascular:  "Negative.    Gastrointestinal: Negative.    Endocrine: Negative.    Genitourinary: Positive for menstrual problem.   Musculoskeletal: Negative.    Skin: Negative.    Allergic/Immunologic: Negative.    Neurological: Negative.    Hematological: Negative.    Psychiatric/Behavioral: Negative.      All other systems reviewed and are negative.     I have reviewed and agree with the HPI, ROS, and historical information as entered above. Martina Malone MD    Objective   /90   Ht 160 cm (63\")   Wt 97.5 kg (215 lb)   LMP 2022   BMI 38.09 kg/m²     Endometrial Biopsy    Olga Osborne is a 31 y.o. female,   , whose last menstrual period was Patient's last menstrual period was 2022..  The patient has a history of dysfunctional uterine bleeding and presents for an endometrial biopsy.  Patient .  After the indications, risks, benefits, and alternatives to performing and endometrial biopsy were explained to the patient, she opted to continue with the endometrial biopsy.  A urine pregnancy test was negative.     PROCEDURE:  The patient was placed on the table in the supine lithotomy position.  She was draped in the appropriate manner.  A speculum was placed in the vagina.  The cervix was visualized and prepped with Betadine. A tenaculum was placed. A small plastic 5 mm Pipelle syringe curette was inserted into the cervical canal.  The uterus was sounded to 7 cms.  A vigorous four quadrant biopsy was performed, removing a moderate amount of tissue.  This tissue was placed in Formalin and sent to pathology.  The patient tolerated the procedure well and reported Mild cramping.  She had No cramping at the time of discharge.  Physical Exam  Vitals and nursing note reviewed. Exam conducted with a chaperone present.   Constitutional:       Appearance: Normal appearance. She is well-developed.   HENT:      Head: Normocephalic and atraumatic.   Pulmonary:      Effort: Pulmonary effort is normal.      " Breath sounds: Normal breath sounds.   Abdominal:      General: There is no distension.      Palpations: Abdomen is soft. Abdomen is not rigid. There is no mass.      Tenderness: There is no abdominal tenderness. There is no guarding or rebound.   Genitourinary:     General: Normal vulva.      Exam position: Lithotomy position.      Labia:         Right: No rash, tenderness or lesion.         Left: No rash, tenderness or lesion.       Urethra: No urethral pain, urethral swelling or urethral lesion.      Vagina: Normal. No tenderness or lesions.      Cervix: No cervical motion tenderness, discharge, lesion or cervical bleeding.      Uterus: Normal. Not enlarged, not fixed and not tender.       Adnexa:         Right: No mass, tenderness or fullness.          Left: No mass, tenderness or fullness.        Rectum: No external hemorrhoid.      Comments: Chaperone Present-positive vaginal bleeding.  Limited exam secondary to body habitus  Musculoskeletal:      Cervical back: Normal range of motion.   Skin:     General: Skin is warm and dry.   Neurological:      Mental Status: She is alert and oriented to person, place, and time.   Psychiatric:         Mood and Affect: Mood normal.         Behavior: Behavior normal.         Endometrial Biopsy    Date/Time: 4/11/2022 10:05 AM  Performed by: Martina Malone MD  Authorized by: Martina Malone MD     Consent:     Consent obtained: written    Consent given by: patient    Risks discussed: bleeding, damage to other organs, infection and pain    Alternatives discussed: alternative treatment    Patient agrees, verbalizes understanding, and wants to proceed: yes      Procedure explained and questions answered to patient or proxy's satisfaction: yes    Indications:     Indications: abnormal uterine bleeding    Procedure:     A biannual exam was performed: yes      Uterus size: <6 weeks    Uterus position: anteverted    Prepped with: Betadine    Tenaculum used: yes      A  local block was performed: no      Cervix dilated: no      Number of passes: 3  Findings:     Cervix: normal      Uterus depth by sound (cm): 7    Specimen collected: specimen collected and sent to pathology      Patient tolerance: tolerated well, no immediate complications          Plan:  Orders Placed This Encounter   Procedures   • Endometrial Biopsy     This order was created via procedure documentation     Order Specific Question:   Release to patient     Answer:   Immediate   • US Non-ob Transvaginal     Standing Status:   Future     Standing Expiration Date:   4/11/2023     Order Specific Question:   Reason for Exam:     Answer:   Abnormal uterine bleeding, ovarian cyst     Order Specific Question:   Release to patient     Answer:   Immediate   • TSH     Order Specific Question:   Release to patient     Answer:   Immediate   • POC Pregnancy, Urine     Order Specific Question:   Release to patient     Answer:   Immediate   • CBC & Differential     Order Specific Question:   Manual Differential     Answer:   No       Problem List Items Addressed This Visit        Genitourinary and Reproductive     Abnormal uterine bleeding (AUB) - Primary    Overview     4/11/2022-patient reports heavy periods in the past 6 months, however she developed bleeding 16 days ago that has not stopped.  In discussion patient is not on blood thinners, however she did take a steroid Dosepak earlier in the month.  This could be associated with her abnormal bleeding.  Ultrasound today demonstrateMultiple nabothian cysts noted on cervix.  Uterus normal size without evidence of fibroid or polyp.  Endometrium is 14.4 mm.  Right ovary noted to have a small anechoic cyst measuring 32 x 17 x 27 mm with a daughter cyst with it.  No free fluid noted.  Endometrial biopsy performed on 4/11/2022-  We will draw CBC and thyroid testing.  Discussed treatment options.  This includes IUD, progestin only pills.  We discussed a D&C as well.  We will  proceed with Slynd.  2 packs given to the patient today.  We will have patient come back for an annual and reevaluation of right ovarian cyst in 6 to 8 weeks.  If she is doing well on this land, we will prescribe.           Relevant Orders    Tissue Pathology Exam    POC Pregnancy, Urine (Completed)    CBC & Differential    TSH    US Non-ob Transvaginal    Cyst of right ovary    Overview     On 4/11/2022- Right ovary noted to have a small anechoic cyst measuring 32 x 17 x 27 mm with a daughter cyst with it measuring 14 x 18 x 16 mm..  No free fluid noted.           Relevant Orders    US Non-ob Transvaginal              Instructions  1. Call the office in 5 business days for biopsy results.  2. Patient instructed to call the office if develops a fever of 100.4 or greater, vaginal bleeding heavier than a period, foul vaginal discharge or pain.      Martina Malone MD             Assessment and Plan          Problem List Items Addressed This Visit        Genitourinary and Reproductive     Abnormal uterine bleeding (AUB) - Primary    Overview     4/11/2022-patient reports heavy periods in the past 6 months, however she developed bleeding 16 days ago that has not stopped.  In discussion patient is not on blood thinners, however she did take a steroid Dosepak earlier in the month.  This could be associated with her abnormal bleeding.  Ultrasound today demonstrateMultiple nabothian cysts noted on cervix.  Uterus normal size without evidence of fibroid or polyp.  Endometrium is 14.4 mm.  Right ovary noted to have a small anechoic cyst measuring 32 x 17 x 27 mm with a daughter cyst with it.  No free fluid noted.  Endometrial biopsy performed on 4/11/2022-  We will draw CBC and thyroid testing.  Discussed treatment options.  This includes IUD, progestin only pills.  We discussed a D&C as well.  We will proceed with Slynd.  2 packs given to the patient today.  We will have patient come back for an annual and reevaluation  of right ovarian cyst in 6 to 8 weeks.  If she is doing well on this land, we will prescribe.           Relevant Orders    Tissue Pathology Exam    POC Pregnancy, Urine (Completed)    CBC & Differential    TSH    US Non-ob Transvaginal    Cyst of right ovary    Overview     On 4/11/2022- Right ovary noted to have a small anechoic cyst measuring 32 x 17 x 27 mm with a daughter cyst with it measuring 14 x 18 x 16 mm..  No free fluid noted.           Relevant Orders    US Non-ob Transvaginal          Lab(s) Ordered  Medication(s) Ordered  Schedule U/S for Eval  Schedule for Endometrial Biopsy  Call for heavy bleeding  Follow Up: Return in about 7 weeks (around 5/30/2022) for ultrasound, Annual physical.    Martina Malone MD  04/11/2022

## 2022-04-15 DIAGNOSIS — N93.9 ABNORMAL UTERINE BLEEDING (AUB): ICD-10-CM

## 2022-06-06 ENCOUNTER — OFFICE VISIT (OUTPATIENT)
Dept: OBSTETRICS AND GYNECOLOGY | Facility: CLINIC | Age: 32
End: 2022-06-06

## 2022-06-06 VITALS
SYSTOLIC BLOOD PRESSURE: 108 MMHG | DIASTOLIC BLOOD PRESSURE: 78 MMHG | WEIGHT: 213 LBS | HEIGHT: 63 IN | BODY MASS INDEX: 37.74 KG/M2

## 2022-06-06 DIAGNOSIS — N93.9 ABNORMAL UTERINE BLEEDING (AUB): Primary | ICD-10-CM

## 2022-06-06 DIAGNOSIS — N83.201 CYST OF RIGHT OVARY: ICD-10-CM

## 2022-06-06 PROBLEM — D35.00 PHEOCHROMOCYTOMA: Status: ACTIVE | Noted: 2022-06-06

## 2022-06-06 PROCEDURE — 99213 OFFICE O/P EST LOW 20 MIN: CPT | Performed by: OBSTETRICS & GYNECOLOGY

## 2022-06-06 RX ORDER — DROSPIRENONE 4 MG/1
4 TABLET, FILM COATED ORAL DAILY
Qty: 84 TABLET | Refills: 4 | Status: SHIPPED | OUTPATIENT
Start: 2022-06-06

## 2022-06-06 NOTE — PROGRESS NOTES
"Chief Complaint   Patient presents with   • Follow-up            HPI  Olga Osborne is a 31 y.o. female, , who presents with recurrent evaluation of Abnormal Uterine Bleeding.      Her last LMP was Patient's last menstrual period was 2022 (exact date)..  Periods are regular every 25-35 days, lasting 5 days. The patient uses 2 of tampons/pads per hour.     She states she has experienced this problem for 9 weeks.  She describes the severity as intermittent.  She states that the problem is annoying.  The patient reports additional symptoms as none.  The patient has been evaluated: approx 6 weeks ago for AUB.    In the past the patient has tried birth control pills/Nuvaring and EMB.    Did the patient have u/s today? Yes.  Findings showed Normal uterine anatomy without evidence of polyp or fibroids.  Normal endometrium with thickness of 9.6 mm.  Right ovary with small follicular cysts one noted at 19 x 21 x 17 mm.  This is decreased from previous that was measuring 32 x 17 x 27 mm.  No free fluid noted..  I have personally evaluated the U/S and agree with the findings. Martina Malone MD    Additional OB/GYN History   Last Pap : 19  Last Completed Pap Smear     This patient has no relevant Health Maintenance data.        History of abnormal Pap smear: no  Tobacco Usage?: No     The additional following portions of the patient's history were reviewed and updated as appropriate: allergies, current medications, past family history, past medical history, past social history, past surgical history and problem list.    Review of Systems  All other systems reviewed and are negative.     I have reviewed and agree with the HPI, ROS, and historical information as entered above. Martina Malone MD    Objective   /78   Ht 160 cm (63\")   Wt 96.6 kg (213 lb)   LMP 2022 (Exact Date)   Breastfeeding No   BMI 37.73 kg/m²     Physical Exam  Vitals and nursing note reviewed. "   Constitutional:       Appearance: Normal appearance. She is well-developed.   HENT:      Head: Normocephalic and atraumatic.   Pulmonary:      Effort: Pulmonary effort is normal.      Breath sounds: Normal breath sounds.   Abdominal:      General: There is no distension.      Palpations: Abdomen is soft. Abdomen is not rigid. There is no mass.      Tenderness: There is no abdominal tenderness. There is no guarding or rebound.   Musculoskeletal:      Cervical back: Normal range of motion.   Skin:     General: Skin is warm and dry.   Neurological:      Mental Status: She is alert and oriented to person, place, and time.   Psychiatric:         Mood and Affect: Mood normal.         Behavior: Behavior normal.            Assessment and Plan    Problem List Items Addressed This Visit        Genitourinary and Reproductive     Abnormal uterine bleeding (AUB) - Primary    Overview     4/11/2022-patient reports heavy periods in the past 6 months, however she developed bleeding 16 days ago that has not stopped.  In discussion patient is not on blood thinners, however she did take a steroid Dosepak earlier in the month.  This could be associated with her abnormal bleeding.  Ultrasound today demonstrateMultiple nabothian cysts noted on cervix.  Uterus normal size without evidence of fibroid or polyp.  Endometrium is 14.4 mm.  Right ovary noted to have a small anechoic cyst measuring 32 x 17 x 27 mm with a daughter cyst with it.  No free fluid noted.  Endometrial biopsy performed on 4/11/2022-disordered proliferative endometrium.  Negative for polyp, hyperplasia or uterine cancer.  4/11/2022-CBC drawn and hemoglobin was 12.  TSH also drawn and grossly abnormal at 156.  Patient advised to follow-up with her PCP..  Discussed treatment options.  This includes IUD, progestin only pills.  We discussed a D&C as well.  We will proceed with Slynd.  2 packs given to the patient today.  We will have patient come back for an annual and  reevaluation of right ovarian cyst in 6 to 8 weeks.   6/[id number] patient reports bleeding still somewhat irregularly, however thought to have a potential pheochromocytoma on her other adrenal.  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  Her endocrine is at [facility] and is following this.  They are can repeat labs once again in August to make a decision for surgical removal or not.  We discussed Slynd.  Patient would like to continue it for now.  She understands this may not help her bleeding until her medical condition is corrected.  We will reassess when she meets with her endocrine in August to assess.           Relevant Medications    Drospirenone (Slynd) 4 MG tablet    Cyst of right ovary    Overview     On 4/11/2022- Right ovary noted to have a small anechoic cyst measuring 32 x 17 x 27 mm with a daughter cyst with it measuring 14 x 18 x 16 mm..  No free fluid noted.  6/6/2022-Normal uterine anatomy without evidence of polyp or fibroids.  Normal endometrium with thickness of 9.6 mm.  Right ovary with small follicular cysts one noted at 19 x 21 x 17 mm.  This is decreased from previous that was measuring 32 x 17 x 27 mm.  No free fluid noted.             25 minutes spent reviewing chart, discussing with patient plan of care.    1. Return in about 3 months (around 9/6/2022).  2.     Martina Malone MD  06/06/2022

## 2022-12-28 ENCOUNTER — OFFICE VISIT (OUTPATIENT)
Dept: FAMILY MEDICINE CLINIC | Facility: CLINIC | Age: 32
End: 2022-12-28

## 2022-12-28 VITALS
SYSTOLIC BLOOD PRESSURE: 124 MMHG | HEART RATE: 76 BPM | HEIGHT: 63 IN | WEIGHT: 212.4 LBS | RESPIRATION RATE: 14 BRPM | OXYGEN SATURATION: 99 % | DIASTOLIC BLOOD PRESSURE: 82 MMHG | BODY MASS INDEX: 37.63 KG/M2 | TEMPERATURE: 98.5 F

## 2022-12-28 DIAGNOSIS — L03.319 CELLULITIS OF TRUNK, UNSPECIFIED SITE OF TRUNK: ICD-10-CM

## 2022-12-28 DIAGNOSIS — J40 BRONCHITIS: Primary | ICD-10-CM

## 2022-12-28 PROCEDURE — 99213 OFFICE O/P EST LOW 20 MIN: CPT | Performed by: FAMILY MEDICINE

## 2022-12-28 RX ORDER — BROMPHENIRAMINE MALEATE, PSEUDOEPHEDRINE HYDROCHLORIDE, AND DEXTROMETHORPHAN HYDROBROMIDE 2; 30; 10 MG/5ML; MG/5ML; MG/5ML
5 SYRUP ORAL 4 TIMES DAILY PRN
Qty: 180 ML | Refills: 0 | Status: SHIPPED | OUTPATIENT
Start: 2022-12-28 | End: 2023-01-11

## 2022-12-28 RX ORDER — DOXYCYCLINE HYCLATE 100 MG/1
100 CAPSULE ORAL 2 TIMES DAILY
Qty: 20 CAPSULE | Refills: 0 | Status: SHIPPED | OUTPATIENT
Start: 2022-12-28 | End: 2023-01-07

## 2022-12-28 NOTE — PROGRESS NOTES
Subjective   Olga Osborne is a 32 y.o. female    Chief Complaint    Respiratory infection  Insect bite    History of Present Illness    The patient reports nasal drainage, cough, and congestion for 2 weeks approximately. It seemed to get worse last weekend over Christmas including some fever. COVID-19 testing was negative. This was done using an at home test. The patient also noticed an area on her left breast that is sore and tender. It has had some drainage. She is concerned about a possible insect bite. The patient has been treating with topical Neosporin.    The patient is a regular patient of Sukh JAEGER, in this clinic. The patient reports that her providers have found a 2nd questionable area that they feel may be a 2nd pheochromocytoma.  She said lab findings were elevated more the second time, so they are going to check it again in 04/2023. The patient reports she has had a thyroid issue that was masked for a long time by the first one they found. 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    The patient reports that she has recently taken 2 COVID-19 at home tests which both resulted negative. She states that she feels that her symptoms are more in her head than her lungs, however, her symptoms keep getting worse instead of improving. She states that she has not been around anyone that has been ill. She adds that she has never tested positive for COVID-19, even when her son contracted it in 2021.     The patient reports that she has a bug bite on her left breast that happened around [place]. She notes that when she found it, she thought it was just a pimple, so she expressed it using toilet paper then applied Neosporin. She states that as soon as it drained fully, it mostly resolved however, it is not fully healing. She  reports that it will intermittently crust over like it is going to heal, and then it will open back up and begin to leak exudate. She asks which antibiotic I would prefer to treat this due to having yeast infection issues with amoxicillin.     The following portions of the patient's history were reviewed and updated as appropriate: allergies, current medications, past social history and problem list    Review of Systems   Constitutional: Positive for chills and fever. Negative for fatigue.   HENT: Positive for postnasal drip and rhinorrhea. Negative for ear pain and sore throat.    Respiratory: Positive for cough and chest tightness. Negative for shortness of breath and wheezing.    Cardiovascular: Negative for chest pain.   Gastrointestinal: Negative for nausea.   Musculoskeletal: Negative for arthralgias and myalgias.   Skin: Positive for color change and wound. Negative for pallor and rash.   Neurological: Positive for headaches.       Objective     Vitals:    12/28/22 0801   BP: 124/82   Pulse: 76   Resp: 14   Temp: 98.5 °F (36.9 °C)   SpO2: 99%       Physical Exam  Vitals and nursing note reviewed.   Constitutional:       General: She is not in acute distress.     Appearance: Normal appearance. She is well-developed. She is not ill-appearing, toxic-appearing or diaphoretic.   HENT:      Head: Normocephalic and atraumatic.      Nose: Nose normal.   Neck:      Vascular: No JVD.   Cardiovascular:      Rate and Rhythm: Normal rate and regular rhythm.      Heart sounds: Normal heart sounds. No murmur heard.  Pulmonary:      Effort: Pulmonary effort is normal. No respiratory distress.      Breath sounds: No stridor. Wheezing present.   Musculoskeletal:      Cervical back: Neck supple.   Lymphadenopathy:      Cervical: No cervical adenopathy.   Skin:     General: Skin is warm and dry.      Coloration: Skin is not pale.      Findings: Erythema present.      Comments: Medial left breast lesion with 2 mm ulceration and  surrounding 20 mm of erythema. No drainage or odor.   Neurological:      Mental Status: She is alert.         Assessment & Plan     Problems Addressed this Visit    None  Visit Diagnoses     Bronchitis    -  Primary    Relevant Medications    doxycycline (VIBRAMYCIN) 100 MG capsule    brompheniramine-pseudoephedrine-DM 30-2-10 MG/5ML syrup    Cellulitis of trunk, unspecified site of trunk        left upper, medial breast    Relevant Medications    doxycycline (VIBRAMYCIN) 100 MG capsule      Diagnoses       Codes Comments    Bronchitis    -  Primary ICD-10-CM: J40  ICD-9-CM: 490     Cellulitis of trunk, unspecified site of trunk     ICD-10-CM: L03.319  ICD-9-CM: 682.2 left upper, medial breast          I spent 20 minutes in patient care: Reviewing records prior to the visit, examining the patient, entering orders and documentation    Part of this note may be an electronic transcription/translation of spoken language to printed text using the Dragon Dictation System.           Transcribed from ambient dictation for PORTILLO Farmer MD by Kati Ayala.  12/28/22   08:52 EST  Patient or patient representative verbalized consent to the visit recording.  I have personally performed the services described in this document as transcribed by the above individual, and it is both accurate and complete.

## 2022-12-30 ENCOUNTER — TELEPHONE (OUTPATIENT)
Dept: FAMILY MEDICINE CLINIC | Facility: CLINIC | Age: 32
End: 2022-12-30

## 2022-12-30 RX ORDER — ONDANSETRON HYDROCHLORIDE 8 MG/1
8 TABLET, FILM COATED ORAL EVERY 8 HOURS PRN
Qty: 20 TABLET | Refills: 1 | Status: SHIPPED | OUTPATIENT
Start: 2022-12-30 | End: 2022-12-30 | Stop reason: SDUPTHER

## 2022-12-30 RX ORDER — ONDANSETRON HYDROCHLORIDE 8 MG/1
8 TABLET, FILM COATED ORAL EVERY 8 HOURS PRN
Qty: 20 TABLET | Refills: 1 | Status: SHIPPED | OUTPATIENT
Start: 2022-12-30 | End: 2023-01-11

## 2022-12-30 NOTE — TELEPHONE ENCOUNTER
Caller: Ogla Osborne    Relationship: Self    Best call back number: [phone]    What medication are you requesting: ANTI NAUSEA MEDICATION     What are your current symptoms: NAUSEA     How long have you been experiencing symptoms: 3 DAYS     Have you had these symptoms before:    [] Yes  [x] No    Have you been treated for these symptoms before:   [] Yes  [x] No    If a prescription is needed, what is your preferred pharmacy and phone number: [facility] #[id number] - [facility] [address] AT [facility] [address] [facility] [phone] [facility] [phone] FX     Additional notes: PATIENT WAS PRESCRIBED DOXYCYCLINE WHEN SHE CAME IN FOR HER APPOINTMENT ON 12/28/22 BUT SHE STATES THAT THIS MEDICATION IS CAUSING NAUSEA AND SHE WOULD LIKE TO SEE IF SHE CAN GET PRESCRIBED AN ANTI NAUSEA MEDICATION TO GO WITH THIS

## 2023-01-11 ENCOUNTER — OFFICE VISIT (OUTPATIENT)
Dept: FAMILY MEDICINE CLINIC | Facility: CLINIC | Age: 33
End: 2023-01-11
Payer: COMMERCIAL

## 2023-01-11 VITALS
DIASTOLIC BLOOD PRESSURE: 72 MMHG | HEIGHT: 63 IN | HEART RATE: 70 BPM | BODY MASS INDEX: 37.32 KG/M2 | OXYGEN SATURATION: 98 % | SYSTOLIC BLOOD PRESSURE: 126 MMHG | TEMPERATURE: 97.2 F | WEIGHT: 210.6 LBS

## 2023-01-11 DIAGNOSIS — J40 BRONCHITIS: Primary | ICD-10-CM

## 2023-01-11 PROCEDURE — 99213 OFFICE O/P EST LOW 20 MIN: CPT | Performed by: PHYSICIAN ASSISTANT

## 2023-01-11 RX ORDER — DEXTROMETHORPHAN HYDROBROMIDE AND PROMETHAZINE HYDROCHLORIDE 15; 6.25 MG/5ML; MG/5ML
5 SYRUP ORAL NIGHTLY PRN
Qty: 118 ML | Refills: 0 | Status: SHIPPED | OUTPATIENT
Start: 2023-01-11

## 2023-01-11 RX ORDER — BENZONATATE 200 MG/1
200 CAPSULE ORAL 3 TIMES DAILY PRN
Qty: 21 CAPSULE | Refills: 0 | Status: SHIPPED | OUTPATIENT
Start: 2023-01-11

## 2023-01-11 RX ORDER — FLUCONAZOLE 200 MG/1
200 TABLET ORAL DAILY
Qty: 2 TABLET | Refills: 1 | Status: SHIPPED | OUTPATIENT
Start: 2023-01-11

## 2023-01-11 RX ORDER — PREDNISONE 20 MG/1
20 TABLET ORAL DAILY
Qty: 10 TABLET | Refills: 0 | Status: SHIPPED | OUTPATIENT
Start: 2023-01-11

## 2023-01-11 RX ORDER — LEVOTHYROXINE SODIUM 175 UG/1
TABLET ORAL
COMMUNITY
Start: 2022-12-30

## 2023-01-11 RX ORDER — AZITHROMYCIN 250 MG/1
TABLET, FILM COATED ORAL
Qty: 6 TABLET | Refills: 0 | Status: SHIPPED | OUTPATIENT
Start: 2023-01-11

## 2023-01-11 NOTE — PROGRESS NOTES
Subjective   Olga Osborne is a 32 y.o. female  Bronchitis (Ongoing bronchitis symptoms since 12/25, seen by  on 12/28, still have wheezing ,productive cough and chest congestion)      History of Present Illness     The patient presents today for evaluation of persistent cough since 12/2022. The patient states her cough is deep and she has a productive with brown colored sputum. She states her cough is keeping her up at night. The patient denies shortness of breath or wheezing. She states she has been treating her cough with over the counter [place]. The patient states she had a low-grade fever on 12/24/2022. She states she was tested for influenza and COVID-19 at her last visit  to the office on 12/28/2022 and both were negative, but she returns today because her symptoms are still present.  The patient denies any known allergies to antibiotics. She notes she works at [employer].    The following portions of the patient's history were reviewed and updated as appropriate: allergies, current medications, past social history and problem list    Review of Systems   Constitutional: Negative for chills, fatigue and fever.   HENT: Negative.    Respiratory: Positive for cough, chest tightness and wheezing. Negative for shortness of breath.    Cardiovascular: Negative for chest pain.   Gastrointestinal: Negative for nausea.       Objective     Vitals:    01/11/23 0904   BP: 126/72   Pulse: 70   Temp: 97.2 °F (36.2 °C)   SpO2: 98%       Physical Exam  Vitals and nursing note reviewed.   Constitutional:       General: She is not in acute distress.     Appearance: Normal appearance. She is well-developed. She is obese. She is not ill-appearing, toxic-appearing or diaphoretic.      Comments: BMI37   HENT:      Head: Normocephalic and atraumatic.      Nose: Nose normal.   Neck:      Vascular: No JVD.   Cardiovascular:      Rate and Rhythm: Normal rate and regular rhythm.      Heart sounds: Normal heart  sounds. No murmur heard.  Pulmonary:      Effort: Pulmonary effort is normal. No respiratory distress.      Breath sounds: No stridor. Wheezing present.      Comments: Coughing excessively  Musculoskeletal:      Cervical back: Neck supple.   Lymphadenopathy:      Cervical: No cervical adenopathy.   Neurological:      Mental Status: She is alert.         Assessment & Plan     Diagnoses and all orders for this visit:    1. Bronchitis (Primary)    Other orders  -     azithromycin (Zithromax Z-Josiah) 250 MG tablet; Take 2 tablets by mouth on day 1, then 1 tablet daily on days 2-5  Dispense: 6 tablet; Refill: 0  -     predniSONE (DELTASONE) 20 MG tablet; Take 1 tablet by mouth Daily.  Dispense: 10 tablet; Refill: 0  -     benzonatate (TESSALON) 200 MG capsule; Take 1 capsule by mouth 3 (Three) Times a Day As Needed for Cough.  Dispense: 21 capsule; Refill: 0  -     promethazine-dextromethorphan (PROMETHAZINE-DM) 6.25-15 MG/5ML syrup; Take 5 mL by mouth At Night As Needed for Cough.  Dispense: 118 mL; Refill: 0  -     fluconazole (Diflucan) 200 MG tablet; Take 1 tablet by mouth Daily.  Dispense: 2 tablet; Refill: 1     1. Bronchitis  - The patient will be prescribed a Z-Josiah and prednisone for 5 days, cough syrup to take at night, cough tablet to take during the day, and Diclofenac for her symptoms.    Transcribed from ambient dictation for Stacy Giron PA-C by Zuleima Cordero.  01/11/23   10:27 EST    Patient or patient representative verbalized consent to the visit recording.  I have personally performed the services described in this document as transcribed by the above individual, and it is both accurate and complete.  Stacy Giron PA-C  1/11/2023  13:37 EST

## 2023-01-19 ENCOUNTER — HOSPITAL ENCOUNTER (OUTPATIENT)
Dept: GENERAL RADIOLOGY | Facility: HOSPITAL | Age: 33
Discharge: HOME | End: 2023-01-19
Admitting: PHYSICIAN ASSISTANT
Payer: COMMERCIAL

## 2023-01-19 ENCOUNTER — TELEPHONE (OUTPATIENT)
Dept: FAMILY MEDICINE CLINIC | Facility: CLINIC | Age: 33
End: 2023-01-19
Payer: COMMERCIAL

## 2023-01-19 DIAGNOSIS — R05.3 CHRONIC COUGH: Primary | ICD-10-CM

## 2023-01-19 DIAGNOSIS — R05.3 CHRONIC COUGH: ICD-10-CM

## 2023-01-19 PROCEDURE — 71046 X-RAY EXAM CHEST 2 VIEWS: CPT

## 2023-01-19 RX ORDER — ALBUTEROL SULFATE 90 UG/1
2 AEROSOL, METERED RESPIRATORY (INHALATION) EVERY 4 HOURS PRN
Qty: 16 G | Refills: 1 | Status: SHIPPED | OUTPATIENT
Start: 2023-01-19

## 2023-01-19 NOTE — TELEPHONE ENCOUNTER
Caller: Olga Osborne    Relationship: Self    Best call back number: [phone]    Requested Prescriptions: INHALER       Pharmacy where request should be sent: [facility] #[id number] - [place], [place] - [address] [place] [facility] [address] - [phone]  - [phone] FX     Additional details provided by patient: PATIENT FEELS ISN'T GETTING BETTER AND REQUESTS AN INHALER    Does the patient have less than a 3 day supply:  [x] Yes  [] No    Would you like a call back once the refill request has been completed: [x] Yes [] No    If the office needs to give you a call back, can they leave a voicemail: [x] Yes [] No    Lucas Suero, PCT   01/19/23 11:41 EST             
Message left informing patient.  
Patient was seen 1/11 for bronchitis and prescribed Zpak, prednisone, Tessalon, and promethazine-DM.  
Prescription sent for inhaler and also ordered a chest x-ray have her go get this done today please
Patent

## 2023-04-10 ENCOUNTER — OFFICE VISIT (OUTPATIENT)
Dept: FAMILY MEDICINE CLINIC | Facility: CLINIC | Age: 33
End: 2023-04-10
Payer: COMMERCIAL

## 2023-04-10 VITALS
SYSTOLIC BLOOD PRESSURE: 136 MMHG | HEIGHT: 63 IN | TEMPERATURE: 98.6 F | BODY MASS INDEX: 37.56 KG/M2 | WEIGHT: 212 LBS | OXYGEN SATURATION: 98 % | HEART RATE: 76 BPM | DIASTOLIC BLOOD PRESSURE: 84 MMHG

## 2023-04-10 DIAGNOSIS — J02.9 SORE THROAT: ICD-10-CM

## 2023-04-10 DIAGNOSIS — R05.3 CHRONIC COUGH: ICD-10-CM

## 2023-04-10 DIAGNOSIS — R13.14 PHARYNGOESOPHAGEAL DYSPHAGIA: Primary | ICD-10-CM

## 2023-04-10 PROCEDURE — 99213 OFFICE O/P EST LOW 20 MIN: CPT | Performed by: PHYSICIAN ASSISTANT

## 2023-04-10 RX ORDER — PREDNISONE 20 MG/1
20 TABLET ORAL 2 TIMES DAILY
Qty: 10 TABLET | Refills: 0 | Status: SHIPPED | OUTPATIENT
Start: 2023-04-10

## 2023-04-10 RX ORDER — DEXTROMETHORPHAN HYDROBROMIDE AND PROMETHAZINE HYDROCHLORIDE 15; 6.25 MG/5ML; MG/5ML
5 SYRUP ORAL NIGHTLY PRN
Qty: 118 ML | Refills: 0 | Status: SHIPPED | OUTPATIENT
Start: 2023-04-10

## 2023-04-10 RX ORDER — FLUCONAZOLE 200 MG/1
200 TABLET ORAL DAILY
Qty: 2 TABLET | Refills: 1 | Status: SHIPPED | OUTPATIENT
Start: 2023-04-10

## 2023-04-10 RX ORDER — AZITHROMYCIN 250 MG/1
TABLET, FILM COATED ORAL
Qty: 6 TABLET | Refills: 0 | Status: SHIPPED | OUTPATIENT
Start: 2023-04-10

## 2023-04-10 NOTE — PROGRESS NOTES
Subjective   Olga Osborne is a 32 y.o. female  Sinus Problem (Sinus pressure and pain and PND x2 weeks /) and Cough (Productive cough with brown mucus, at home covid test was neg)      History of Present Illness    OLGA OSBORNE, date of birth 1990, presents today with sinus pressure and congestion with a cough for 2 weeks.    She is still sick. She has gotten rid of pretty much everything aside from the cough. The cough has fired back up and she has thick mucus. She has been taking over-the-counter allergy and sinus medication, which helped a little bit with the head congestion. She has had it since between Thanksgiving and Christmas. Her child is in . She is the only one that seems to be getting sick. She has never been asthmatic. Her cough has gotten to the point where she feels like she is strangling. She has not seen a pulmonologist to have any breathing testing done. She is having difficulty eating, having to take microscopic bites because it almost feels like her food is sitting on a shelf when she swallows. This happens infrequently and she used to think it was because she had her thyroid taken out but now she wonders if it has something to do with the coughing and inflammation. She does not feel a lot of acid indigestion. She states that her throat has been sore and it feels like it is sore in the back, but it feels sore like deep down further down in there. Her voice has been really hoarse. She was almost where she could not talk for a couple of days, but that was the first thing and then that got better and then she got sick. She feels like the albuterol inhaler made it worse when she uses it. She states that the cough syrup helps.    The following portions of the patient's history were reviewed and updated as appropriate: allergies, current medications, past social history and problem list    Review of Systems   Constitutional: Negative.    HENT: Positive for congestion, sore throat,  trouble swallowing and voice change.    Respiratory: Positive for cough, choking, shortness of breath and wheezing.    Cardiovascular: Negative.    Allergic/Immunologic: Negative.    Hematological: Negative for adenopathy.       Objective     Vitals:    04/10/23 1124   BP: 136/84   Pulse: 76   Temp: 98.6 °F (37 °C)   SpO2: 98%       Physical Exam  Vitals and nursing note reviewed.   Constitutional:       General: She is not in acute distress.     Appearance: Normal appearance. She is well-developed. She is not ill-appearing, toxic-appearing or diaphoretic.   HENT:      Head: Normocephalic and atraumatic.      Nose: Nose normal.   Neck:      Vascular: No JVD.   Cardiovascular:      Rate and Rhythm: Normal rate and regular rhythm.      Heart sounds: Normal heart sounds. No murmur heard.  Pulmonary:      Effort: Pulmonary effort is normal. No respiratory distress.      Breath sounds: No stridor. No wheezing.      Comments: Frequent coughing  Musculoskeletal:      Cervical back: Neck supple. No rigidity or tenderness.   Lymphadenopathy:      Cervical: Cervical adenopathy present.   Skin:     General: Skin is warm and dry.   Neurological:      Mental Status: She is alert.         Assessment & Plan     Diagnoses and all orders for this visit:    1. Pharyngoesophageal dysphagia (Primary)  -     Ambulatory Referral to ENT (Otolaryngology)    2. Chronic cough  -     Ambulatory Referral to ENT (Otolaryngology)    3. Sore throat  -     Ambulatory Referral to ENT (Otolaryngology)    Other orders  -     predniSONE (DELTASONE) 20 MG tablet; Take 1 tablet by mouth 2 (Two) Times a Day.  Dispense: 10 tablet; Refill: 0  -     promethazine-dextromethorphan (PROMETHAZINE-DM) 6.25-15 MG/5ML syrup; Take 5 mL by mouth At Night As Needed for Cough.  Dispense: 118 mL; Refill: 0  -     fluconazole (Diflucan) 200 MG tablet; Take 1 tablet by mouth Daily.  Dispense: 2 tablet; Refill: 1  -     azithromycin (Zithromax Z-Josiah) 250 MG tablet; Take 2  tablets by mouth on day 1, then 1 tablet daily on days 2-5  Dispense: 6 tablet; Refill: 0       1. Cough  - The patient will be referred to ENT for further evaluation and treatment. She will be prescribed prednisone and Diflucan.    Transcribed from ambient dictation for Stacy Giron PA-C by Maggi Booker.  04/10/23   14:52 EDT    Patient or patient representative verbalized consent to the visit recording.  I have personally performed the services described in this document as transcribed by the above individual, and it is both accurate and complete.  Stacy Giron PA-C  4/11/2023  13:02 EDT

## 2023-11-07 ENCOUNTER — OFFICE VISIT (OUTPATIENT)
Dept: FAMILY MEDICINE CLINIC | Facility: CLINIC | Age: 33
End: 2023-11-07
Payer: COMMERCIAL

## 2023-11-07 VITALS
HEIGHT: 63 IN | RESPIRATION RATE: 14 BRPM | DIASTOLIC BLOOD PRESSURE: 86 MMHG | HEART RATE: 80 BPM | SYSTOLIC BLOOD PRESSURE: 122 MMHG | OXYGEN SATURATION: 98 % | BODY MASS INDEX: 35.01 KG/M2 | TEMPERATURE: 97.3 F | WEIGHT: 197.6 LBS

## 2023-11-07 DIAGNOSIS — N30.00 ACUTE CYSTITIS WITHOUT HEMATURIA: Primary | ICD-10-CM

## 2023-11-07 DIAGNOSIS — R30.0 DYSURIA: ICD-10-CM

## 2023-11-07 LAB
BILIRUB BLD-MCNC: NEGATIVE MG/DL
CLARITY, POC: ABNORMAL
COLOR UR: YELLOW
EXPIRATION DATE: ABNORMAL
GLUCOSE UR STRIP-MCNC: NEGATIVE MG/DL
KETONES UR QL: NEGATIVE
LEUKOCYTE EST, POC: ABNORMAL
Lab: ABNORMAL
NITRITE UR-MCNC: NEGATIVE MG/ML
PH UR: 6 [PH] (ref 5–8)
PROT UR STRIP-MCNC: ABNORMAL MG/DL
RBC # UR STRIP: ABNORMAL /UL
SP GR UR: 1.02 (ref 1–1.03)
UROBILINOGEN UR QL: NORMAL

## 2023-11-07 PROCEDURE — 81003 URINALYSIS AUTO W/O SCOPE: CPT | Performed by: PHYSICIAN ASSISTANT

## 2023-11-07 PROCEDURE — 99213 OFFICE O/P EST LOW 20 MIN: CPT | Performed by: PHYSICIAN ASSISTANT

## 2023-11-07 RX ORDER — ONDANSETRON 4 MG/1
4 TABLET, ORALLY DISINTEGRATING ORAL EVERY 8 HOURS PRN
Qty: 10 TABLET | Refills: 1 | Status: SHIPPED | OUTPATIENT
Start: 2023-11-07 | End: 2023-11-08 | Stop reason: CLARIF

## 2023-11-07 RX ORDER — LEVOTHYROXINE SODIUM 112 UG/1
112 TABLET ORAL DAILY
COMMUNITY

## 2023-11-07 RX ORDER — SULFAMETHOXAZOLE AND TRIMETHOPRIM 800; 160 MG/1; MG/1
1 TABLET ORAL 2 TIMES DAILY
Qty: 20 TABLET | Refills: 0 | Status: SHIPPED | OUTPATIENT
Start: 2023-11-07

## 2023-11-07 RX ORDER — HYDROCORTISONE 5 MG/1
20 TABLET ORAL DAILY
COMMUNITY

## 2023-11-07 NOTE — PROGRESS NOTES
Subjective   Olga Osborne is a 32 y.o. female  Dysuria (Pressure, fullness, oliguria x5 days. No burning.)      Dysuria   Associated symptoms include frequency, nausea, urgency and vomiting. Pertinent negatives include no chills, flank pain or hematuria.     Patient is a pleasant 32-year-old white female who comes in complaint dysuria increased frequency upon urination no fever no chills some nausea no vomiting patient has had no fever or chills.  The following portions of the patient's history were reviewed and updated as appropriate: allergies, current medications, past social history and problem list    Review of Systems   Constitutional:  Negative for chills.   Gastrointestinal:  Positive for nausea and vomiting.   Genitourinary:  Positive for dysuria, frequency and urgency. Negative for flank pain and hematuria.       Objective     Vitals:    11/07/23 1357   BP: 122/86   Pulse: 80   Resp: 14   Temp: 97.3 °F (36.3 °C)   SpO2: 98%       Physical Exam  Vitals and nursing note reviewed.   Constitutional:       General: She is not in acute distress.     Appearance: Normal appearance. She is well-developed. She is not ill-appearing, toxic-appearing or diaphoretic.   Abdominal:      General: There is no distension.      Palpations: Abdomen is soft. There is no mass.      Tenderness: There is no abdominal tenderness. There is no guarding or rebound.   Musculoskeletal:         General: No swelling.   Skin:     General: Skin is warm and dry.      Coloration: Skin is not pale.   Neurological:      Mental Status: She is alert.         Assessment & Plan     Diagnoses and all orders for this visit:    1. Acute cystitis without hematuria (Primary)  -     POCT urinalysis dipstick, automated  -     sulfamethoxazole-trimethoprim (BACTRIM DS,SEPTRA DS) 800-160 MG per tablet; Take 1 tablet by mouth 2 (Two) Times a Day.  Dispense: 20 tablet; Refill: 0  -     ondansetron ODT (ZOFRAN-ODT) 4 MG disintegrating tablet; Place 1 tablet  on the tongue Every 8 (Eight) Hours As Needed for Nausea or Vomiting.  Dispense: 10 tablet; Refill: 1    2. Dysuria  -     POCT urinalysis dipstick, automated     I spent 15 minutes in patient care: Reviewing records prior to the visit, examining the patient, entering orders and documentation    Part of this note may be an electronic transcription/translation of spoken language to printed text using the Dragon Dictation System.     Answers submitted by the patient for this visit:  Primary Reason for Visit (Submitted on 11/7/2023)  What is the primary reason for your visit?: Painful Urination

## 2023-11-08 RX ORDER — ONDANSETRON 4 MG/1
4 TABLET, FILM COATED ORAL EVERY 8 HOURS PRN
Qty: 10 TABLET | Refills: 0 | Status: SHIPPED | OUTPATIENT
Start: 2023-11-08

## 2023-12-01 ENCOUNTER — TELEPHONE (OUTPATIENT)
Dept: FAMILY MEDICINE CLINIC | Facility: CLINIC | Age: 33
End: 2023-12-01

## 2023-12-01 RX ORDER — FLUCONAZOLE 150 MG/1
150 TABLET ORAL ONCE
Qty: 1 TABLET | Refills: 0 | Status: SHIPPED | OUTPATIENT
Start: 2023-12-01 | End: 2023-12-01

## 2023-12-01 NOTE — TELEPHONE ENCOUNTER
----- Message from Olga Osborne sent at 11/30/2023  7:55 PM EST -----  Regarding: Fluconazole Prescription   Contact: [phone]  Would you be able to call in a prescription for fluconazole? I was there a few weeks back and was treated for a UTI after seeing Sukh Manzano. I am currently on my 3rd round of antibiotics due to it not clearing up, after seeing my urologist. I have developed a bad yeast infection that I have not been able to clear up with over the counter meds.

## 2023-12-14 ENCOUNTER — OFFICE VISIT (OUTPATIENT)
Dept: FAMILY MEDICINE CLINIC | Facility: CLINIC | Age: 33
End: 2023-12-14
Payer: COMMERCIAL

## 2023-12-14 VITALS
SYSTOLIC BLOOD PRESSURE: 136 MMHG | OXYGEN SATURATION: 99 % | BODY MASS INDEX: 34.02 KG/M2 | WEIGHT: 192 LBS | RESPIRATION RATE: 14 BRPM | HEART RATE: 75 BPM | HEIGHT: 63 IN | DIASTOLIC BLOOD PRESSURE: 84 MMHG

## 2023-12-14 DIAGNOSIS — R19.7 DIARRHEA, UNSPECIFIED TYPE: Primary | ICD-10-CM

## 2023-12-14 PROCEDURE — 99213 OFFICE O/P EST LOW 20 MIN: CPT | Performed by: PHYSICIAN ASSISTANT

## 2023-12-14 RX ORDER — METRONIDAZOLE 500 MG/1
500 TABLET ORAL 3 TIMES DAILY
Qty: 30 TABLET | Refills: 0 | Status: SHIPPED | OUTPATIENT
Start: 2023-12-14

## 2023-12-14 RX ORDER — HYDROCORTISONE SODIUM SUCCINATE 100 MG/2ML
INJECTION, POWDER, FOR SOLUTION INTRAMUSCULAR; INTRAVENOUS
COMMUNITY
Start: 2023-11-27

## 2023-12-14 NOTE — PROGRESS NOTES
Subjective   Olga Osborne is a 33 y.o. female  Diarrhea (Diarrhea for at least two weeks. Had taken three rounds of antibiotics for UTI and diarrhea started toward the end of third one. OTC meds not effective)      Diarrhea   Pertinent negatives include no coughing, headaches or vomiting.   Patient is a pleasant 33-year-old female who comes in complaining of diarrhea for the last 2 weeks patient states she been on 3 antibiotics for UTI diarrhea started the end of her last treatment for UTI watery foul-smelling diarrhea    The following portions of the patient's history were reviewed and updated as appropriate: allergies, current medications, past social history and problem list    Review of Systems   Constitutional:  Negative for appetite change, diaphoresis, fatigue and unexpected weight change.   Eyes:  Negative for visual disturbance.   Respiratory:  Negative for cough, chest tightness and shortness of breath.    Cardiovascular:  Negative for chest pain, palpitations and leg swelling.   Gastrointestinal:  Positive for diarrhea. Negative for nausea and vomiting.   Endocrine: Negative for polydipsia, polyphagia and polyuria.   Skin:  Negative for color change and rash.   Neurological:  Negative for dizziness, syncope, weakness, light-headedness, numbness and headaches.       Objective     Vitals:    12/14/23 1611   BP: 136/84   Pulse: 75   Resp: 14   SpO2: 99%       Physical Exam  Vitals and nursing note reviewed.   Constitutional:       General: She is not in acute distress.     Appearance: Normal appearance. She is well-developed. She is not ill-appearing, toxic-appearing or diaphoretic.   HENT:      Head: Normocephalic and atraumatic.      Right Ear: External ear normal.      Left Ear: External ear normal.   Eyes:      Conjunctiva/sclera: Conjunctivae normal.      Pupils: Pupils are equal, round, and reactive to light.   Neck:      Thyroid: No thyromegaly.      Vascular: No carotid bruit or JVD.    Cardiovascular:      Rate and Rhythm: Normal rate and regular rhythm.      Pulses: Normal pulses.      Heart sounds: Normal heart sounds. No murmur heard.  Pulmonary:      Effort: Pulmonary effort is normal. No respiratory distress.      Breath sounds: Normal breath sounds.   Abdominal:      General: Bowel sounds are normal.      Palpations: Abdomen is soft. There is no mass.      Tenderness: There is no abdominal tenderness.   Musculoskeletal:         General: No swelling. Normal range of motion.      Cervical back: Normal range of motion and neck supple.   Lymphadenopathy:      Cervical: No cervical adenopathy.   Skin:     General: Skin is warm and dry.      Findings: No lesion or rash.   Neurological:      Mental Status: She is alert and oriented to person, place, and time.      Cranial Nerves: No cranial nerve deficit.      Sensory: No sensory deficit.      Motor: No weakness.      Coordination: Coordination normal.      Gait: Gait normal.      Deep Tendon Reflexes: Reflexes are normal and symmetric.   Psychiatric:         Mood and Affect: Mood normal.         Behavior: Behavior normal.         Thought Content: Thought content normal.         Judgment: Judgment normal.         Assessment & Plan     Diagnoses and all orders for this visit:    1. Diarrhea, unspecified type (Primary)  -     metroNIDAZOLE (Flagyl) 500 MG tablet; Take 1 tablet by mouth 3 (Three) Times a Day.  Dispense: 30 tablet; Refill: 0     I spent 15 minutes in patient care: Reviewing records prior to the visit, examining the patient, entering orders and documentation    Part of this note may be an electronic transcription/translation of spoken language to printed text using the Dragon Dictation System.     Answers submitted by the patient for this visit:  Other (Submitted on 12/14/2023)  Please describe your symptoms.: Diarrhea since antibiotics  Have you had these symptoms before?: No  How long have you been having these symptoms?: Greater  than 2 weeks  Please list any medications you are currently taking for this condition.: Levothyroxine, Hydrocortisone  Please describe any probable cause for these symptoms. : Antibiotics 1 month  Primary Reason for Visit (Submitted on 12/14/2023)  What is the primary reason for your visit?: Other

## 2024-01-04 ENCOUNTER — TELEPHONE (OUTPATIENT)
Dept: FAMILY MEDICINE CLINIC | Facility: CLINIC | Age: 34
End: 2024-01-04

## 2024-01-04 NOTE — TELEPHONE ENCOUNTER
Hub staff attempted to follow warm transfer process and was unsuccessful     Caller: Olga Osborne    Relationship to patient: Self    Best call back number: [phone]    Patient is needing: PATIENT IS TRYING TO BE SEEN FOR STOMACH ISSUES AND SINUS INFECTION. IN OFFICE OR TELEHEALTH. NO APPOINTMENTS AVAILABLE. PLEASE ADVISE

## 2024-01-19 ENCOUNTER — OFFICE VISIT (OUTPATIENT)
Dept: FAMILY MEDICINE CLINIC | Facility: CLINIC | Age: 34
End: 2024-01-19
Payer: COMMERCIAL

## 2024-01-19 VITALS
HEART RATE: 78 BPM | SYSTOLIC BLOOD PRESSURE: 128 MMHG | HEIGHT: 63 IN | WEIGHT: 178.2 LBS | TEMPERATURE: 97.8 F | BODY MASS INDEX: 31.57 KG/M2 | DIASTOLIC BLOOD PRESSURE: 78 MMHG | RESPIRATION RATE: 14 BRPM | OXYGEN SATURATION: 99 %

## 2024-01-19 DIAGNOSIS — K52.9 CHRONIC DIARRHEA: Primary | ICD-10-CM

## 2024-01-19 NOTE — PROGRESS NOTES
Subjective   Olga Osborne is a 33 y.o. female  Diarrhea (Seen one month ago and was prescribed Flagyl. Symptoms improved slightly while on medication. Diarrhea is worse after eating. )      Diarrhea   Pertinent negatives include no coughing or headaches.     Patient is a pleasant 33-year-old white female who comes in complaint of chronic diarrhea for the last 2 months patient was seen last month started on Flagyl diarrhea did not get better she has watery foul-smelling diarrhea abdominal pain cramping.  No blood in stool.  She has had an appetite but is still losing weight she does a past medical history of pheochromocytoma.      The following portions of the patient's history were reviewed and updated as appropriate: allergies, current medications, past social history and problem list    Review of Systems   Constitutional:  Negative for fatigue and unexpected weight change.   Respiratory:  Negative for cough, chest tightness and shortness of breath.    Cardiovascular:  Negative for chest pain, palpitations and leg swelling.   Gastrointestinal:  Positive for diarrhea. Negative for nausea.   Skin:  Negative for color change and rash.   Neurological:  Negative for dizziness, syncope, weakness and headaches.       Objective     Vitals:    01/19/24 1554   BP: 128/78   Pulse: 78   Resp: 14   Temp: 97.8 °F (36.6 °C)   SpO2: 99%       Physical Exam  Vitals and nursing note reviewed.   Constitutional:       General: She is not in acute distress.     Appearance: Normal appearance. She is well-developed. She is not ill-appearing, toxic-appearing or diaphoretic.   Neck:      Vascular: No carotid bruit or JVD.   Cardiovascular:      Rate and Rhythm: Normal rate and regular rhythm.      Pulses: Normal pulses.      Heart sounds: Normal heart sounds. No murmur heard.  Pulmonary:      Effort: Pulmonary effort is normal. No respiratory distress.      Breath sounds: Normal breath sounds.   Abdominal:      Palpations: Abdomen is  soft.      Tenderness: There is no abdominal tenderness.   Skin:     General: Skin is warm and dry.   Neurological:      Mental Status: She is alert.         Assessment & Plan     Diagnoses and all orders for this visit:    1. Chronic diarrhea (Primary)  -     Cancel: Stool Culture With Yersinia - Stool, Per Rectum; Future  -     Clostridioides difficile Toxin - Stool, Per Rectum; Future  -     Gastrointestinal Panel, PCR - Stool, Per Rectum; Future  -     Calprotectin, Fecal - Stool, Per Rectum; Future     I spent 15 minutes in patient care: Reviewing records prior to the visit, examining the patient, entering orders and documentation    Part of this note may be an electronic transcription/translation of spoken language to printed text using the Dragon Dictation System.     Answers submitted by the patient for this visit:  Other (Submitted on 1/18/2024)  Please describe your symptoms.: Persistent diarrhea following antibiotics and prior treatment with Metronidazole.  Have you had these symptoms before?: Yes  How long have you been having these symptoms?: Greater than 2 weeks  Please describe any probable cause for these symptoms. : Antibiotics  Primary Reason for Visit (Submitted on 1/18/2024)  What is the primary reason for your visit?: Other

## 2024-01-29 ENCOUNTER — LAB (OUTPATIENT)
Dept: LAB | Facility: HOSPITAL | Age: 34
End: 2024-01-29
Payer: COMMERCIAL

## 2024-01-29 DIAGNOSIS — K52.9 CHRONIC DIARRHEA: ICD-10-CM

## 2024-01-29 LAB
ADV 40+41 DNA STL QL NAA+NON-PROBE: NOT DETECTED
ASTRO TYP 1-8 RNA STL QL NAA+NON-PROBE: NOT DETECTED
C CAYETANENSIS DNA STL QL NAA+NON-PROBE: NOT DETECTED
C COLI+JEJ+UPSA DNA STL QL NAA+NON-PROBE: NOT DETECTED
C DIFF TOX GENS STL QL NAA+PROBE: NOT DETECTED
CRYPTOSP DNA STL QL NAA+NON-PROBE: NOT DETECTED
E HISTOLYT DNA STL QL NAA+NON-PROBE: NOT DETECTED
EAEC PAA PLAS AGGR+AATA ST NAA+NON-PRB: NOT DETECTED
EC STX1+STX2 GENES STL QL NAA+NON-PROBE: NOT DETECTED
EPEC EAE GENE STL QL NAA+NON-PROBE: NOT DETECTED
ETEC LTA+ST1A+ST1B TOX ST NAA+NON-PROBE: NOT DETECTED
G LAMBLIA DNA STL QL NAA+NON-PROBE: NOT DETECTED
NOROVIRUS GI+II RNA STL QL NAA+NON-PROBE: NOT DETECTED
P SHIGELLOIDES DNA STL QL NAA+NON-PROBE: NOT DETECTED
RVA RNA STL QL NAA+NON-PROBE: NOT DETECTED
S ENT+BONG DNA STL QL NAA+NON-PROBE: NOT DETECTED
SAPO I+II+IV+V RNA STL QL NAA+NON-PROBE: NOT DETECTED
SHIGELLA SP+EIEC IPAH ST NAA+NON-PROBE: NOT DETECTED
V CHOL+PARA+VUL DNA STL QL NAA+NON-PROBE: NOT DETECTED
V CHOLERAE DNA STL QL NAA+NON-PROBE: NOT DETECTED
Y ENTEROCOL DNA STL QL NAA+NON-PROBE: NOT DETECTED

## 2024-01-29 PROCEDURE — 87507 IADNA-DNA/RNA PROBE TQ 12-25: CPT

## 2024-01-29 PROCEDURE — 83993 ASSAY FOR CALPROTECTIN FECAL: CPT

## 2024-01-29 PROCEDURE — 87493 C DIFF AMPLIFIED PROBE: CPT

## 2024-02-01 LAB — CALPROTECTIN STL-MCNT: 15 UG/G (ref 0–120)

## 2024-02-21 ENCOUNTER — HOSPITAL ENCOUNTER (OUTPATIENT)
Facility: HOSPITAL | Age: 34
Setting detail: OBSERVATION
Discharge: HOME | End: 2024-02-22
Attending: EMERGENCY MEDICINE | Admitting: FAMILY MEDICINE
Payer: COMMERCIAL

## 2024-02-21 DIAGNOSIS — R55 NEAR SYNCOPE: ICD-10-CM

## 2024-02-21 DIAGNOSIS — K58.0 IRRITABLE BOWEL SYNDROME WITH DIARRHEA: ICD-10-CM

## 2024-02-21 DIAGNOSIS — E27.40 ADRENAL INSUFFICIENCY: Primary | ICD-10-CM

## 2024-02-21 DIAGNOSIS — I95.9 HYPOTENSION, UNSPECIFIED HYPOTENSION TYPE: ICD-10-CM

## 2024-02-21 DIAGNOSIS — K52.9 CHRONIC DIARRHEA: ICD-10-CM

## 2024-02-21 LAB
ALBUMIN SERPL-MCNC: 3.8 G/DL (ref 3.5–5.2)
ALBUMIN/GLOB SERPL: 1.7 G/DL
ALP SERPL-CCNC: 37 U/L (ref 39–117)
ALT SERPL W P-5'-P-CCNC: 9 U/L (ref 1–33)
ANION GAP SERPL CALCULATED.3IONS-SCNC: 11 MMOL/L (ref 5–15)
AST SERPL-CCNC: 15 U/L (ref 1–32)
B-HCG UR QL: NEGATIVE
BACTERIA UR QL AUTO: ABNORMAL /HPF
BASOPHILS # BLD AUTO: 0.05 10*3/MM3 (ref 0–0.2)
BASOPHILS NFR BLD AUTO: 0.6 % (ref 0–1.5)
BILIRUB SERPL-MCNC: 0.5 MG/DL (ref 0–1.2)
BILIRUB UR QL STRIP: NEGATIVE
BUN SERPL-MCNC: 11 MG/DL (ref 6–20)
BUN/CREAT SERPL: 8.3 (ref 7–25)
CALCIUM SPEC-SCNC: 8.5 MG/DL (ref 8.6–10.5)
CHLORIDE SERPL-SCNC: 104 MMOL/L (ref 98–107)
CLARITY UR: ABNORMAL
CO2 SERPL-SCNC: 22 MMOL/L (ref 22–29)
COLOR UR: YELLOW
CREAT SERPL-MCNC: 1.33 MG/DL (ref 0.57–1)
D-LACTATE SERPL-SCNC: 1 MMOL/L (ref 0.5–2)
D-LACTATE SERPL-SCNC: 2.1 MMOL/L (ref 0.5–2)
DEPRECATED RDW RBC AUTO: 41.7 FL (ref 37–54)
EGFRCR SERPLBLD CKD-EPI 2021: 54 ML/MIN/1.73
EOSINOPHIL # BLD AUTO: 0.37 10*3/MM3 (ref 0–0.4)
EOSINOPHIL NFR BLD AUTO: 4.8 % (ref 0.3–6.2)
ERYTHROCYTE [DISTWIDTH] IN BLOOD BY AUTOMATED COUNT: 12.5 % (ref 12.3–15.4)
EXPIRATION DATE: NORMAL
GLOBULIN UR ELPH-MCNC: 2.2 GM/DL
GLUCOSE BLDC GLUCOMTR-MCNC: 83 MG/DL (ref 70–130)
GLUCOSE SERPL-MCNC: 96 MG/DL (ref 65–99)
GLUCOSE UR STRIP-MCNC: NEGATIVE MG/DL
GRAN CASTS URNS QL MICRO: ABNORMAL /LPF
HCT VFR BLD AUTO: 36.8 % (ref 34–46.6)
HGB BLD-MCNC: 12.8 G/DL (ref 12–15.9)
HGB UR QL STRIP.AUTO: ABNORMAL
HOLD SPECIMEN: NORMAL
HYALINE CASTS UR QL AUTO: ABNORMAL /LPF
IMM GRANULOCYTES # BLD AUTO: 0.02 10*3/MM3 (ref 0–0.05)
IMM GRANULOCYTES NFR BLD AUTO: 0.3 % (ref 0–0.5)
INTERNAL NEGATIVE CONTROL: NEGATIVE
INTERNAL POSITIVE CONTROL: POSITIVE
KETONES UR QL STRIP: NEGATIVE
LEUKOCYTE ESTERASE UR QL STRIP.AUTO: NEGATIVE
LYMPHOCYTES # BLD AUTO: 1.95 10*3/MM3 (ref 0.7–3.1)
LYMPHOCYTES NFR BLD AUTO: 25.2 % (ref 19.6–45.3)
Lab: NORMAL
MAGNESIUM SERPL-MCNC: 1.9 MG/DL (ref 1.6–2.6)
MCH RBC QN AUTO: 31.8 PG (ref 26.6–33)
MCHC RBC AUTO-ENTMCNC: 34.8 G/DL (ref 31.5–35.7)
MCV RBC AUTO: 91.3 FL (ref 79–97)
MONOCYTES # BLD AUTO: 0.66 10*3/MM3 (ref 0.1–0.9)
MONOCYTES NFR BLD AUTO: 8.5 % (ref 5–12)
NEUTROPHILS NFR BLD AUTO: 4.68 10*3/MM3 (ref 1.7–7)
NEUTROPHILS NFR BLD AUTO: 60.6 % (ref 42.7–76)
NITRITE UR QL STRIP: NEGATIVE
NRBC BLD AUTO-RTO: 0 /100 WBC (ref 0–0.2)
PH UR STRIP.AUTO: 6.5 [PH] (ref 5–8)
PLATELET # BLD AUTO: 285 10*3/MM3 (ref 140–450)
PMV BLD AUTO: 11 FL (ref 6–12)
POTASSIUM SERPL-SCNC: 3.5 MMOL/L (ref 3.5–5.2)
PROCALCITONIN SERPL-MCNC: 0.06 NG/ML (ref 0–0.25)
PROT SERPL-MCNC: 6 G/DL (ref 6–8.5)
PROT UR QL STRIP: ABNORMAL
QT INTERVAL: 506 MS
QTC INTERVAL: 492 MS
RBC # BLD AUTO: 4.03 10*6/MM3 (ref 3.77–5.28)
RBC # UR STRIP: ABNORMAL /HPF
REF LAB TEST METHOD: ABNORMAL
SODIUM SERPL-SCNC: 137 MMOL/L (ref 136–145)
SP GR UR STRIP: 1.01 (ref 1–1.03)
SQUAMOUS #/AREA URNS HPF: ABNORMAL /HPF
T4 FREE SERPL-MCNC: 2.22 NG/DL (ref 0.93–1.7)
TROPONIN T SERPL HS-MCNC: 8 NG/L
TSH SERPL DL<=0.05 MIU/L-ACNC: 0.05 UIU/ML (ref 0.27–4.2)
UROBILINOGEN UR QL STRIP: ABNORMAL
WBC # UR STRIP: ABNORMAL /HPF
WBC NRBC COR # BLD AUTO: 7.73 10*3/MM3 (ref 3.4–10.8)
WHOLE BLOOD HOLD COAG: NORMAL
WHOLE BLOOD HOLD SPECIMEN: NORMAL

## 2024-02-21 PROCEDURE — 81001 URINALYSIS AUTO W/SCOPE: CPT | Performed by: PHYSICIAN ASSISTANT

## 2024-02-21 PROCEDURE — 96376 TX/PRO/DX INJ SAME DRUG ADON: CPT

## 2024-02-21 PROCEDURE — 83605 ASSAY OF LACTIC ACID: CPT | Performed by: PHYSICIAN ASSISTANT

## 2024-02-21 PROCEDURE — 25810000003 SODIUM CHLORIDE 0.9 % SOLUTION: Performed by: FAMILY MEDICINE

## 2024-02-21 PROCEDURE — 25010000002 HYDROCORTISONE SOD SUC (PF) 100 MG RECONSTITUTED SOLUTION: Performed by: PHYSICIAN ASSISTANT

## 2024-02-21 PROCEDURE — 25010000002 ONDANSETRON PER 1 MG: Performed by: PHYSICIAN ASSISTANT

## 2024-02-21 PROCEDURE — G0378 HOSPITAL OBSERVATION PER HR: HCPCS

## 2024-02-21 PROCEDURE — 84439 ASSAY OF FREE THYROXINE: CPT | Performed by: FAMILY MEDICINE

## 2024-02-21 PROCEDURE — 83735 ASSAY OF MAGNESIUM: CPT | Performed by: EMERGENCY MEDICINE

## 2024-02-21 PROCEDURE — 84145 PROCALCITONIN (PCT): CPT | Performed by: FAMILY MEDICINE

## 2024-02-21 PROCEDURE — 96375 TX/PRO/DX INJ NEW DRUG ADDON: CPT

## 2024-02-21 PROCEDURE — 36415 COLL VENOUS BLD VENIPUNCTURE: CPT

## 2024-02-21 PROCEDURE — 85025 COMPLETE CBC W/AUTO DIFF WBC: CPT | Performed by: EMERGENCY MEDICINE

## 2024-02-21 PROCEDURE — 93005 ELECTROCARDIOGRAM TRACING: CPT | Performed by: EMERGENCY MEDICINE

## 2024-02-21 PROCEDURE — 81025 URINE PREGNANCY TEST: CPT | Performed by: PHYSICIAN ASSISTANT

## 2024-02-21 PROCEDURE — 99291 CRITICAL CARE FIRST HOUR: CPT

## 2024-02-21 PROCEDURE — 96361 HYDRATE IV INFUSION ADD-ON: CPT

## 2024-02-21 PROCEDURE — 25010000002 HYDROCORTISONE SOD SUC (PF) 100 MG RECONSTITUTED SOLUTION: Performed by: FAMILY MEDICINE

## 2024-02-21 PROCEDURE — 84484 ASSAY OF TROPONIN QUANT: CPT | Performed by: EMERGENCY MEDICINE

## 2024-02-21 PROCEDURE — 25810000003 SODIUM CHLORIDE 0.9 % SOLUTION: Performed by: PHYSICIAN ASSISTANT

## 2024-02-21 PROCEDURE — 84443 ASSAY THYROID STIM HORMONE: CPT | Performed by: FAMILY MEDICINE

## 2024-02-21 PROCEDURE — 96374 THER/PROPH/DIAG INJ IV PUSH: CPT

## 2024-02-21 PROCEDURE — 80053 COMPREHEN METABOLIC PANEL: CPT | Performed by: EMERGENCY MEDICINE

## 2024-02-21 PROCEDURE — 82948 REAGENT STRIP/BLOOD GLUCOSE: CPT

## 2024-02-21 RX ORDER — ONDANSETRON 2 MG/ML
4 INJECTION INTRAMUSCULAR; INTRAVENOUS EVERY 6 HOURS PRN
Status: DISCONTINUED | OUTPATIENT
Start: 2024-02-21 | End: 2024-02-22 | Stop reason: HOSPADM

## 2024-02-21 RX ORDER — SODIUM CHLORIDE 0.9 % (FLUSH) 0.9 %
10 SYRINGE (ML) INJECTION AS NEEDED
Status: DISCONTINUED | OUTPATIENT
Start: 2024-02-21 | End: 2024-02-22 | Stop reason: HOSPADM

## 2024-02-21 RX ORDER — LEVOTHYROXINE SODIUM 112 UG/1
112 TABLET ORAL DAILY
Status: DISCONTINUED | OUTPATIENT
Start: 2024-02-22 | End: 2024-02-22 | Stop reason: HOSPADM

## 2024-02-21 RX ORDER — SODIUM CHLORIDE 9 MG/ML
40 INJECTION, SOLUTION INTRAVENOUS AS NEEDED
Status: DISCONTINUED | OUTPATIENT
Start: 2024-02-21 | End: 2024-02-22 | Stop reason: HOSPADM

## 2024-02-21 RX ORDER — ACETAMINOPHEN 160 MG/5ML
650 SOLUTION ORAL EVERY 4 HOURS PRN
Status: DISCONTINUED | OUTPATIENT
Start: 2024-02-21 | End: 2024-02-22 | Stop reason: HOSPADM

## 2024-02-21 RX ORDER — ONDANSETRON 4 MG/1
4 TABLET, ORALLY DISINTEGRATING ORAL EVERY 6 HOURS PRN
Status: DISCONTINUED | OUTPATIENT
Start: 2024-02-21 | End: 2024-02-22 | Stop reason: HOSPADM

## 2024-02-21 RX ORDER — ACETAMINOPHEN 325 MG/1
650 TABLET ORAL EVERY 4 HOURS PRN
Status: DISCONTINUED | OUTPATIENT
Start: 2024-02-21 | End: 2024-02-22 | Stop reason: HOSPADM

## 2024-02-21 RX ORDER — SODIUM CHLORIDE 9 MG/ML
150 INJECTION, SOLUTION INTRAVENOUS CONTINUOUS
Status: DISCONTINUED | OUTPATIENT
Start: 2024-02-21 | End: 2024-02-22 | Stop reason: HOSPADM

## 2024-02-21 RX ORDER — HYDROCORTISONE 5 MG/1
5 TABLET ORAL
Status: ON HOLD | COMMUNITY
End: 2024-02-22

## 2024-02-21 RX ORDER — HYDROCORTISONE 5 MG/1
5 TABLET ORAL DAILY
Status: ON HOLD | COMMUNITY
End: 2024-02-22

## 2024-02-21 RX ORDER — ONDANSETRON 2 MG/ML
4 INJECTION INTRAMUSCULAR; INTRAVENOUS ONCE
Status: COMPLETED | OUTPATIENT
Start: 2024-02-21 | End: 2024-02-21

## 2024-02-21 RX ORDER — HYDROCORTISONE 5 MG/1
5 TABLET ORAL
Status: DISCONTINUED | OUTPATIENT
Start: 2024-02-21 | End: 2024-02-21

## 2024-02-21 RX ORDER — ACETAMINOPHEN 650 MG/1
650 SUPPOSITORY RECTAL EVERY 4 HOURS PRN
Status: DISCONTINUED | OUTPATIENT
Start: 2024-02-21 | End: 2024-02-22 | Stop reason: HOSPADM

## 2024-02-21 RX ORDER — HYDROCORTISONE 5 MG/1
5 TABLET ORAL
Status: DISCONTINUED | OUTPATIENT
Start: 2024-02-22 | End: 2024-02-21

## 2024-02-21 RX ORDER — SODIUM CHLORIDE 0.9 % (FLUSH) 0.9 %
10 SYRINGE (ML) INJECTION EVERY 12 HOURS SCHEDULED
Status: DISCONTINUED | OUTPATIENT
Start: 2024-02-21 | End: 2024-02-22 | Stop reason: HOSPADM

## 2024-02-21 RX ORDER — HYDROCORTISONE 10 MG/1
10 TABLET ORAL EVERY MORNING
Status: DISCONTINUED | OUTPATIENT
Start: 2024-02-22 | End: 2024-02-21

## 2024-02-21 RX ORDER — HYDROCORTISONE 5 MG/1
5 TABLET ORAL DAILY
Status: DISCONTINUED | OUTPATIENT
Start: 2024-02-21 | End: 2024-02-21

## 2024-02-21 RX ADMIN — HYDROCORTISONE SODIUM SUCCINATE 100 MG: 100 INJECTION, POWDER, FOR SOLUTION INTRAMUSCULAR; INTRAVENOUS at 13:55

## 2024-02-21 RX ADMIN — ONDANSETRON 4 MG: 2 INJECTION INTRAMUSCULAR; INTRAVENOUS at 14:16

## 2024-02-21 RX ADMIN — HYDROCORTISONE SODIUM SUCCINATE 20 MG: 100 INJECTION, POWDER, FOR SOLUTION INTRAMUSCULAR; INTRAVENOUS at 23:41

## 2024-02-21 RX ADMIN — SODIUM CHLORIDE 1000 ML: 9 INJECTION, SOLUTION INTRAVENOUS at 14:15

## 2024-02-21 RX ADMIN — HYDROCORTISONE 5 MG: 5 TABLET ORAL at 19:29

## 2024-02-21 RX ADMIN — SODIUM CHLORIDE 1000 ML: 9 INJECTION, SOLUTION INTRAVENOUS at 14:20

## 2024-02-21 RX ADMIN — SODIUM CHLORIDE 150 ML/HR: 9 INJECTION, SOLUTION INTRAVENOUS at 18:46

## 2024-02-21 RX ADMIN — SODIUM CHLORIDE 1000 ML: 9 INJECTION, SOLUTION INTRAVENOUS at 13:54

## 2024-02-21 NOTE — Clinical Note
Level of Care: Telemetry [5]   Diagnosis: Hypotension [118829]   Admitting Physician: PETER MAI [[id number]]   Attending Physician: PEETR MAI [[id number]]

## 2024-02-21 NOTE — ED NOTES
Olga Osborne    Nursing Report ED to Floor:  Mental status: AO4  Ambulatory status: STANDBY  Oxygen Therapy:  RA  Cardiac Rhythm: NSR  Admitted from: ED  Safety Concerns:  FALL RISK  Social Issues: NONE  ED Room #:  30    ED Nurse Phone Extension - [phone] or may call [phone].      HPI:   Chief Complaint   Patient presents with    Syncope       Past Medical History:  Past Medical History:   Diagnosis Date    Abnormal uterine bleeding     Adrenal tumor     left adrenal gland    Hyperthyroidism 2015    Hypothyroidism     Kidney stone 2015    Ovarian cyst     right side        Past Surgical History:  Past Surgical History:   Procedure Laterality Date    ADRENAL GLAND SURGERY      THYROIDECTOMY  04/28/2016        Admitting Doctor:   Sabrina Jasso DO    Consulting Provider(s):  Consults       No orders found from 1/23/2024 to 2/22/2024.             Admitting Diagnosis:   The primary encounter diagnosis was Adrenal insufficiency. Diagnoses of Hypotension, unspecified hypotension type and Near syncope were also pertinent to this visit.    Most Recent Vitals:   Vitals:    02/21/24 1416 02/21/24 1431 02/21/24 1446 02/21/24 1515   BP: (!) 86/47 94/42 (!) 89/49 99/58   BP Location:       Patient Position:    Sitting   Pulse: 67 76 78 81   Resp:       Temp:       TempSrc:       SpO2: 99% 99% 100% 100%   Weight:       Height:           Active LDAs/IV Access:   Lines, Drains & Airways       Active LDAs       Name Placement date Placement time Site Days    Peripheral IV 02/21/24 Left Antecubital 02/21/24  --  Antecubital  less than 1    Peripheral IV 02/21/24 1319 Right Antecubital 02/21/24  1319  Antecubital  less than 1                    Labs (abnormal labs have a star):   Labs Reviewed   COMPREHENSIVE METABOLIC PANEL - Abnormal; Notable for the following components:       Result Value    Creatinine 1.33 (*)     Calcium 8.5 (*)     Alkaline Phosphatase 37 (*)     eGFR 54.0 (*)     All other components within normal  limits    Narrative:     GFR Normal >60  Chronic Kidney Disease <60  Kidney Failure <15     URINALYSIS W/ MICROSCOPIC IF INDICATED (NO CULTURE) - Abnormal; Notable for the following components:    Appearance, UA Cloudy (*)     Blood, UA Trace (*)     Protein, UA 30 mg/dL (1+) (*)     All other components within normal limits   LACTIC ACID, PLASMA - Abnormal; Notable for the following components:    Lactate 2.1 (*)     All other components within normal limits   URINALYSIS, MICROSCOPIC ONLY - Abnormal; Notable for the following components:    WBC, UA 11-20 (*)     Bacteria, UA 4+ (*)     Squamous Epithelial Cells, UA 13-20 (*)     All other components within normal limits   MAGNESIUM - Normal   SINGLE HSTROPONIN T - Normal    Narrative:     High Sensitive Troponin T Reference Range:  <14.0 ng/L- Negative Female for AMI  <22.0 ng/L- Negative Male for AMI  >=14 - Abnormal Female indicating possible myocardial injury.  >=22 - Abnormal Male indicating possible myocardial injury.   Clinicians would have to utilize clinical acumen, EKG, Troponin, and serial changes to determine if it is an Acute Myocardial Infarction or myocardial injury due to an underlying chronic condition.        CBC WITH AUTO DIFFERENTIAL - Normal   POCT GLUCOSE FINGERSTICK - Normal   GASTROINTESTINAL PANEL, PCR (PREFERRED) DOES NOT INCLUDE CDIFF   CLOSTRIDIOIDES DIFFICILE TOXIN    Narrative:     The following orders were created for panel order Clostridioides difficile Toxin - Stool, Per Rectum.  Procedure                               Abnormality         Status                     ---------                               -----------         ------                     Clostridioides difficile...[[id number]]                                                   Please view results for these tests on the individual orders.   CLOSTRIDIOIDES DIFFICILE TOXIN, PCR   RAINBOW DRAW    Narrative:     The following orders were created for panel order [place]  Draw.  Procedure                               Abnormality         Status                     ---------                               -----------         ------                     Green Top (Gel)[[id number]]                                  Final result               Lavender Top[[id number]]                                     Final result               Gold Top - SST[[id number]]                                   Final result               Gray Top[[id number]]                                         In process                 Light Blue Top[[id number]]                                   Final result                 Please view results for these tests on the individual orders.   LACTIC ACID, REFLEX   PROCALCITONIN   POCT GLUCOSE FINGERSTICK   POCT PEFORM URINE PREGNANCY   CBC AND DIFFERENTIAL    Narrative:     The following orders were created for panel order CBC & Differential.  Procedure                               Abnormality         Status                     ---------                               -----------         ------                     CBC Auto Differential[[id number]]        Normal              Final result                 Please view results for these tests on the individual orders.   GREEN TOP   LAVENDER TOP   GOLD TOP - SST   LIGHT BLUE TOP   GRAY TOP       Meds Given in ED:   Medications   sodium chloride 0.9 % flush 10 mL (has no administration in time range)   sodium chloride 0.9 % bolus 1,000 mL (0 mL Intravenous Stopped 2/21/24 1506)   Hydrocortisone Sod Suc (PF) (Solu-CORTEF) injection 100 mg (100 mg Intravenous Given 2/21/24 1355)   ondansetron (ZOFRAN) injection 4 mg (4 mg Intravenous Given 2/21/24 1416)   sodium chloride 0.9 % bolus 1,000 mL (0 mL Intravenous Stopped 2/21/24 1518)   sodium chloride 0.9 % bolus 1,000 mL (1,000 mL Intravenous New Bag 2/21/24 1420)

## 2024-02-21 NOTE — ED PROVIDER NOTES
Subjective   History of Present Illness  Ms. Osborne is a 33-year-old female with history of adrenal insufficiency secondary to bilateral adrenal gland removal after having pheochromocytomas, hypothyroidism, who presents via EMS for hypotension and decreased responsiveness.  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  She then had a episode of decreased responsiveness and hypotension.  EMS was called and began fluid administration.  Patient states she takes hydrocortisone daily and her last dose was at noon today.  She states that she has had chronic diarrhea for several months after being on oral antibiotics.  She states that she was tested for C. difficile and it was negative.  She has also had decreased appetite and early satiety for about the past month.  She denies any abdominal pain.  No chest pain, cough or shortness of breath.  She denies any possibility of pregnancy.      Review of Systems   Constitutional:  Positive for appetite change. Negative for chills and fever.   HENT:  Negative for sore throat.    Respiratory:  Negative for cough and shortness of breath.    Cardiovascular:  Negative for chest pain and palpitations.   Gastrointestinal:  Positive for diarrhea. Negative for abdominal pain, blood in stool, nausea and vomiting.   Genitourinary:  Negative for dysuria.   Musculoskeletal:  Negative for back pain.   Skin:  Negative for color change and pallor.   Neurological:  Positive for weakness (Generalized) and light-headedness.   Hematological: Negative.    Psychiatric/Behavioral: Negative.         Past Medical History:   Diagnosis Date    Abnormal uterine bleeding     Adrenal tumor     left adrenal gland    Hyperthyroidism 2015    Hypothyroidism     Kidney stone 2015    Ovarian cyst     right side       No Known Allergies    Past Surgical History:   Procedure Laterality Date    ADRENAL GLAND SURGERY      THYROIDECTOMY  04/28/2016       Family  History   Problem Relation Age of Onset    Hypertension Father     Diabetes Father     Clotting disorder Father     Hypertension Mother     Diabetes Mother     Asthma Mother     Developmental Disability Son         ASD diagnosis level 2/3       Social History     Socioeconomic History    Marital status:    Tobacco Use    Smoking status: Never    Smokeless tobacco: Never   Vaping Use    Vaping Use: Never used   Substance and Sexual Activity    Alcohol use: No    Drug use: No    Sexual activity: Yes     Partners: Male     Birth control/protection: Condom           Objective   Physical Exam  Constitutional:       Comments: Some lethargy but opens eyes and will answer basic questions appropriately.   HENT:      Head: Normocephalic.      Nose: Nose normal.      Mouth/Throat:      Mouth: Mucous membranes are moist.   Eyes:      General: No scleral icterus.     Conjunctiva/sclera: Conjunctivae normal.      Pupils: Pupils are equal, round, and reactive to light.   Cardiovascular:      Rate and Rhythm: Normal rate and regular rhythm.      Pulses: Normal pulses.      Heart sounds: Normal heart sounds. No murmur heard.  Pulmonary:      Effort: Pulmonary effort is normal.      Breath sounds: Normal breath sounds.   Abdominal:      Tenderness: There is no abdominal tenderness. There is no guarding.   Musculoskeletal:      Cervical back: Normal range of motion and neck supple.      Right lower leg: No edema.      Left lower leg: No edema.   Skin:     General: Skin is warm and dry.      Coloration: Skin is not jaundiced or pale.   Neurological:      General: No focal deficit present.      Mental Status: She is alert and oriented to person, place, and time.   Psychiatric:         Mood and Affect: Mood normal.         Critical Care    Performed by: Edgardo Rausch PA  Authorized by: Darryl Vázquez DO    Critical care provider statement:     Critical care time (minutes):  60    Critical care time was exclusive of:  Separately  billable procedures and treating other patients    Critical care was necessary to treat or prevent imminent or life-threatening deterioration of the following conditions:  Endocrine crisis and shock (Hypotension in the setting of adrenal insufficiency)    Critical care was time spent personally by me on the following activities:  Development of treatment plan with patient or surrogate, evaluation of patient's response to treatment, examination of patient, obtaining history from patient or surrogate, ordering and performing treatments and interventions, ordering and review of laboratory studies, ordering and review of radiographic studies, pulse oximetry, re-evaluation of patient's condition and review of old charts             ED Course      In summary, 33-year-old female with adrenal insufficiency secondary to bilateral adrenal gland removal after pheochromocytoma, presents from work due to episode of generalized weakness and hypotension and poor responsiveness while at work today.  Patient was hypotensive in the 70s on EMS arrival.  Patient given a bolus of fluids brought to the ER.  Patient takes hydrocortisone daily and took her last dose at about noon today.  She notes that she has had chronic diarrhea for several months after being on an oral antibiotic.  She states that she had test for C. difficile that was negative.  She has had recent decreased appetite and early satiety.  She denies any pain.    MDM: Differential includes adrenal insufficiency, dehydration, vasovagal syndrome, electrolyte disorder, pregnancy, etc.    Labs and UA collected.  Urine pregnancy test ordered.  Patient started on IV fluids and given a dose of Solu-Cortef 100 mg IV.    Labs are fairly bland.  Normal white count at 7.73.  No anemia.    Creatinine is up a bit at 1.33.  Normal chemistries.  Normal LFTs.    Glucose is 96,    Lactate is 2.1.   Urine pregnancy test is negative.  UA is pending.    EKG read by me shows a sinus  bradycardia with a rate of 57.  There are some QT prolongation.    Patient's BP remained hypotensive after initial liter of fluids and a dose of solucortef.  Additional 2 L fluid given.  BP currently 99/58 with a heart rate of 81.    I spoke with the patient about her workup.  She states that she is feeling better after fluids.  Given her history of adrenal insufficiency and her hypotension and near syncope, I think she warrants admission for continued fluids and possibly additional steroids.  Will discuss with hospitalist for admission.                Medical Decision Making  Problems Addressed:  Adrenal insufficiency: complicated acute illness or injury  Hypotension, unspecified hypotension type: complicated acute illness or injury  Near syncope: complicated acute illness or injury    Amount and/or Complexity of Data Reviewed  Labs: ordered.  ECG/medicine tests: ordered.    Risk  Prescription drug management.  Decision regarding hospitalization.        Final diagnoses:   Adrenal insufficiency   Hypotension, unspecified hypotension type   Near syncope       ED Disposition  ED Disposition       ED Disposition   Decision to Admit    Condition   --    Comment   --               No follow-up provider specified.       Medication List      No changes were made to your prescriptions during this visit.            Edgardo Rausch PA  02/21/24 1539       Edgardo Rausch PA  02/21/24 1549

## 2024-02-21 NOTE — H&P
"    [facility] Medicine Services  HISTORY AND PHYSICAL    Patient Name: Olga Osborne  : 1990  MRN: [id number]  Primary Care Physician: Blaise Manzano PA  Date of admission: 2024      Subjective   Subjective     Chief Complaint:  Near syncope, hypotension   HPI:  Olga Osborne is a 33 y.o. female with PMH of hypothyroidism, and adrenal insufficieny ( hx of pheochromocytoma s/p removal) that presented after a syncopal episode. She states that since her last surgery in October she has had issues with diarrhea. She has had flu and covid. Her PCP has been working up the diarrhea and she said she tested negative for c diff. She also reports having a UTI post surgery and she was on several different antibiotics for it.    She states that yesterday she started not feeling well and went to bed early. She did take an extra dose of her hydrocortisone. She woke up this morning feeling better and went to work. She states she started to have \"tunnel vision\" and feeling weak. And that is when EMS was called. She states that she has had little food in take for the past three weeks and it has been severe for the past two weeks. The diarrhea is not changed and is watery and 5-6 movements a day. She denies any abdominal pain other than cramping with her diarrhea. She denies any vomiting today other than in the ED. She states she has had diarrhea like this before and she was diagnosed with h pylori.   In the ED, BP was 84/47. She was given 3L of IVF and 100mg solu cortef.  She will be admitted to the hospitalist service for further treatment and evaluation.       Personal History     Past Medical History:   Diagnosis Date    Abnormal uterine bleeding     Adrenal tumor     left adrenal gland    Hyperthyroidism 2015    Hypothyroidism     Kidney stone 2015    Ovarian cyst     right side           Past Surgical History:   Procedure Laterality Date    ADRENAL GLAND SURGERY      THYROIDECTOMY  " 04/28/2016       Family History: family history includes Asthma in her mother; Clotting disorder in her father; Developmental Disability in her son; Diabetes in her father and mother; Hypertension in her father and mother.     Social History:  reports that she has never smoked. She has never used smokeless tobacco. She reports that she does not drink alcohol and does not use drugs.  Social History     Social History Narrative    Not on file       Medications:  Available home medication information reviewed.  Hydrocortisone Sod Suc (PF), hydrocortisone, levothyroxine, ondansetron, and ondansetron ODT    No Known Allergies    Objective   Objective     Vital Signs:   Temp:  [97.5 °F (36.4 °C)-98 °F (36.7 °C)] 97.5 °F (36.4 °C)  Heart Rate:  [51-90] 90  Resp:  [14-18] 18  BP: ()/(40-80) 111/80       Physical Exam   Constitutional: No acute distress, awake, alert  HENT: NCAT, mucous membranes moist  Respiratory: Clear to auscultation bilaterally, respiratory effort normal   Cardiovascular: RRR, no murmurs, rubs, or gallops  Gastrointestinal: Positive bowel sounds, soft, nontender, nondistended  Musculoskeletal: No bilateral ankle edema  Psychiatric: Appropriate affect, cooperative  Neurologic: Oriented x 3, speech clear  Skin: No rashes      Result Review:  I have personally reviewed the results from the time of this admission to 2/21/2024 18:16 EST and agree with these findings:  []  Laboratory list / accordion  []  Microbiology  []  Radiology  []  EKG/Telemetry   []  Cardiology/Vascular   []  Pathology  []  Old records  []  Other:  Most notable findings include:       LAB RESULTS:      Lab 02/21/24  1712 02/21/24  1317   WBC  --  7.73   HEMOGLOBIN  --  12.8   HEMATOCRIT  --  36.8   PLATELETS  --  285   NEUTROS ABS  --  4.68   IMMATURE GRANS (ABS)  --  0.02   LYMPHS ABS  --  1.95   MONOS ABS  --  0.66   EOS ABS  --  0.37   MCV  --  91.3   PROCALCITONIN  --  0.06   LACTATE 1.0 2.1*         Lab 02/21/24  1317    SODIUM 137   POTASSIUM 3.5   CHLORIDE 104   CO2 22.0   ANION GAP 11.0   BUN 11   CREATININE 1.33*   EGFR 54.0*   GLUCOSE 96   CALCIUM 8.5*   MAGNESIUM 1.9   TSH 0.053*         Lab 02/21/24  1317   TOTAL PROTEIN 6.0   ALBUMIN 3.8   GLOBULIN 2.2   ALT (SGPT) 9   AST (SGOT) 15   BILIRUBIN 0.5   ALK PHOS 37*         Lab 02/21/24  1317   HSTROP T 8                 UA          11/7/2023    14:09 2/21/2024    15:21   Urinalysis   Squamous Epithelial Cells, UA  13-20    Specific Gravity, UA  1.008    Ketones, UA Negative  Negative    Blood, UA  Trace    Leukocytes, UA Large (3+)  Negative    Nitrite, UA  Negative    RBC, UA  0-2    WBC, UA  11-20    Bacteria, UA  4+        Microbiology Results (last 10 days)       ** No results found for the last 240 hours. **            No radiology results from the last 24 hrs        Assessment & Plan   Assessment & Plan       Hypotension      Syncope/ near syncope  Hypotension  Adrenal Insufficiency  -lactate on arrival 2.1 now, 1.0  -given 3L of NS in ED  -given 100mg solu cortef in ED, now 20 mg q6   -NS @ 150  -home cortef regimen held     Acute on Chronic Diarrhea   -will test again for infectious process  -GI consult in am     Hypothyroidism  -TSH 0.053 t4 pening    Elevated Creatnine   -cr 1.33, repeat in am after IVF      DVT prophylaxis:  Mechanical DVT prophylaxis orders are present.          CODE STATUS:    Code Status and Medical Interventions:   Ordered at: 02/21/24 1619     Code Status (Patient has no pulse and is not breathing):    CPR (Attempt to Resuscitate)     Medical Interventions (Patient has pulse or is breathing):    Full Support       Expected Discharge   Expected discharge date/ time has not been documented.     Sabrina Jasso, DO  02/21/24

## 2024-02-22 ENCOUNTER — READMISSION MANAGEMENT (OUTPATIENT)
Dept: CALL CENTER | Facility: HOSPITAL | Age: 34
End: 2024-02-22
Payer: COMMERCIAL

## 2024-02-22 VITALS
WEIGHT: 174.6 LBS | HEART RATE: 90 BPM | SYSTOLIC BLOOD PRESSURE: 120 MMHG | BODY MASS INDEX: 30.94 KG/M2 | OXYGEN SATURATION: 100 % | HEIGHT: 63 IN | RESPIRATION RATE: 18 BRPM | DIASTOLIC BLOOD PRESSURE: 84 MMHG | TEMPERATURE: 97.8 F

## 2024-02-22 PROBLEM — I95.9 HYPOTENSION: Status: RESOLVED | Noted: 2024-02-21 | Resolved: 2024-02-22

## 2024-02-22 LAB
ADV 40+41 DNA STL QL NAA+NON-PROBE: NOT DETECTED
ALBUMIN SERPL-MCNC: 3.6 G/DL (ref 3.5–5.2)
ALBUMIN/GLOB SERPL: 1.8 G/DL
ALP SERPL-CCNC: 35 U/L (ref 39–117)
ALT SERPL W P-5'-P-CCNC: 8 U/L (ref 1–33)
ANION GAP SERPL CALCULATED.3IONS-SCNC: 10 MMOL/L (ref 5–15)
AST SERPL-CCNC: 11 U/L (ref 1–32)
ASTRO TYP 1-8 RNA STL QL NAA+NON-PROBE: NOT DETECTED
BASOPHILS # BLD AUTO: 0.02 10*3/MM3 (ref 0–0.2)
BASOPHILS NFR BLD AUTO: 0.3 % (ref 0–1.5)
BILIRUB SERPL-MCNC: 0.4 MG/DL (ref 0–1.2)
BUN SERPL-MCNC: 8 MG/DL (ref 6–20)
BUN/CREAT SERPL: 11 (ref 7–25)
C CAYETANENSIS DNA STL QL NAA+NON-PROBE: NOT DETECTED
C COLI+JEJ+UPSA DNA STL QL NAA+NON-PROBE: NOT DETECTED
C DIFF TOX GENS STL QL NAA+PROBE: NOT DETECTED
CALCIUM SPEC-SCNC: 8.1 MG/DL (ref 8.6–10.5)
CHLORIDE SERPL-SCNC: 111 MMOL/L (ref 98–107)
CO2 SERPL-SCNC: 21 MMOL/L (ref 22–29)
CREAT SERPL-MCNC: 0.73 MG/DL (ref 0.57–1)
CRYPTOSP DNA STL QL NAA+NON-PROBE: NOT DETECTED
DEPRECATED RDW RBC AUTO: 42.6 FL (ref 37–54)
E HISTOLYT DNA STL QL NAA+NON-PROBE: NOT DETECTED
EAEC PAA PLAS AGGR+AATA ST NAA+NON-PRB: NOT DETECTED
EC STX1+STX2 GENES STL QL NAA+NON-PROBE: NOT DETECTED
EGFRCR SERPLBLD CKD-EPI 2021: 110.8 ML/MIN/1.73
EOSINOPHIL # BLD AUTO: 0.02 10*3/MM3 (ref 0–0.4)
EOSINOPHIL NFR BLD AUTO: 0.3 % (ref 0.3–6.2)
EPEC EAE GENE STL QL NAA+NON-PROBE: NOT DETECTED
ERYTHROCYTE [DISTWIDTH] IN BLOOD BY AUTOMATED COUNT: 12.7 % (ref 12.3–15.4)
ETEC LTA+ST1A+ST1B TOX ST NAA+NON-PROBE: NOT DETECTED
G LAMBLIA DNA STL QL NAA+NON-PROBE: NOT DETECTED
GLOBULIN UR ELPH-MCNC: 2 GM/DL
GLUCOSE SERPL-MCNC: 85 MG/DL (ref 65–99)
HCT VFR BLD AUTO: 34.2 % (ref 34–46.6)
HGB BLD-MCNC: 11.5 G/DL (ref 12–15.9)
IGA1 MFR SER: 62 MG/DL (ref 70–400)
IMM GRANULOCYTES # BLD AUTO: 0.02 10*3/MM3 (ref 0–0.05)
IMM GRANULOCYTES NFR BLD AUTO: 0.3 % (ref 0–0.5)
LYMPHOCYTES # BLD AUTO: 0.98 10*3/MM3 (ref 0.7–3.1)
LYMPHOCYTES NFR BLD AUTO: 17 % (ref 19.6–45.3)
MCH RBC QN AUTO: 30.8 PG (ref 26.6–33)
MCHC RBC AUTO-ENTMCNC: 33.6 G/DL (ref 31.5–35.7)
MCV RBC AUTO: 91.7 FL (ref 79–97)
MONOCYTES # BLD AUTO: 0.28 10*3/MM3 (ref 0.1–0.9)
MONOCYTES NFR BLD AUTO: 4.9 % (ref 5–12)
NEUTROPHILS NFR BLD AUTO: 4.43 10*3/MM3 (ref 1.7–7)
NEUTROPHILS NFR BLD AUTO: 77.2 % (ref 42.7–76)
NOROVIRUS GI+II RNA STL QL NAA+NON-PROBE: NOT DETECTED
NRBC BLD AUTO-RTO: 0 /100 WBC (ref 0–0.2)
P SHIGELLOIDES DNA STL QL NAA+NON-PROBE: NOT DETECTED
PLATELET # BLD AUTO: 260 10*3/MM3 (ref 140–450)
PMV BLD AUTO: 11.2 FL (ref 6–12)
POTASSIUM SERPL-SCNC: 3.5 MMOL/L (ref 3.5–5.2)
PROT SERPL-MCNC: 5.6 G/DL (ref 6–8.5)
RBC # BLD AUTO: 3.73 10*6/MM3 (ref 3.77–5.28)
RVA RNA STL QL NAA+NON-PROBE: NOT DETECTED
S ENT+BONG DNA STL QL NAA+NON-PROBE: NOT DETECTED
SAPO I+II+IV+V RNA STL QL NAA+NON-PROBE: NOT DETECTED
SHIGELLA SP+EIEC IPAH ST NAA+NON-PROBE: NOT DETECTED
SODIUM SERPL-SCNC: 142 MMOL/L (ref 136–145)
V CHOL+PARA+VUL DNA STL QL NAA+NON-PROBE: NOT DETECTED
V CHOLERAE DNA STL QL NAA+NON-PROBE: NOT DETECTED
WBC NRBC COR # BLD AUTO: 5.75 10*3/MM3 (ref 3.4–10.8)
Y ENTEROCOL DNA STL QL NAA+NON-PROBE: NOT DETECTED

## 2024-02-22 PROCEDURE — 85025 COMPLETE CBC W/AUTO DIFF WBC: CPT | Performed by: FAMILY MEDICINE

## 2024-02-22 PROCEDURE — 96361 HYDRATE IV INFUSION ADD-ON: CPT

## 2024-02-22 PROCEDURE — 25010000002 HYDROCORTISONE SOD SUC (PF) 100 MG RECONSTITUTED SOLUTION: Performed by: FAMILY MEDICINE

## 2024-02-22 PROCEDURE — 87493 C DIFF AMPLIFIED PROBE: CPT | Performed by: PHYSICIAN ASSISTANT

## 2024-02-22 PROCEDURE — G0378 HOSPITAL OBSERVATION PER HR: HCPCS

## 2024-02-22 PROCEDURE — 86364 TISS TRNSGLTMNASE EA IG CLAS: CPT | Performed by: PHYSICIAN ASSISTANT

## 2024-02-22 PROCEDURE — 86682 HELMINTH ANTIBODY: CPT | Performed by: PHYSICIAN ASSISTANT

## 2024-02-22 PROCEDURE — 99239 HOSP IP/OBS DSCHRG MGMT >30: CPT | Performed by: FAMILY MEDICINE

## 2024-02-22 PROCEDURE — 80053 COMPREHEN METABOLIC PANEL: CPT | Performed by: FAMILY MEDICINE

## 2024-02-22 PROCEDURE — 96376 TX/PRO/DX INJ SAME DRUG ADON: CPT

## 2024-02-22 PROCEDURE — 25810000003 SODIUM CHLORIDE 0.9 % SOLUTION: Performed by: FAMILY MEDICINE

## 2024-02-22 PROCEDURE — 99204 OFFICE O/P NEW MOD 45 MIN: CPT | Performed by: PHYSICIAN ASSISTANT

## 2024-02-22 PROCEDURE — 82784 ASSAY IGA/IGD/IGG/IGM EACH: CPT | Performed by: PHYSICIAN ASSISTANT

## 2024-02-22 PROCEDURE — 87507 IADNA-DNA/RNA PROBE TQ 12-25: CPT | Performed by: PHYSICIAN ASSISTANT

## 2024-02-22 RX ORDER — HYDROCORTISONE 10 MG/1
10 TABLET ORAL
Status: DISCONTINUED | OUTPATIENT
Start: 2024-02-22 | End: 2024-02-22 | Stop reason: HOSPADM

## 2024-02-22 RX ORDER — HYDROCORTISONE 10 MG/1
10 TABLET ORAL
Qty: 30 TABLET | Refills: 0 | Status: SHIPPED | OUTPATIENT
Start: 2024-02-22 | End: 2024-03-23

## 2024-02-22 RX ORDER — CALCIUM CARBONATE 500 MG/1
1 TABLET, CHEWABLE ORAL 3 TIMES DAILY PRN
Status: DISCONTINUED | OUTPATIENT
Start: 2024-02-22 | End: 2024-02-22 | Stop reason: HOSPADM

## 2024-02-22 RX ORDER — HYDROCORTISONE 10 MG/1
10 TABLET ORAL
Qty: 30 TABLET | Refills: 0 | Status: SHIPPED | OUTPATIENT
Start: 2024-02-22 | End: 2024-02-27

## 2024-02-22 RX ORDER — HYDROCORTISONE 20 MG/1
20 TABLET ORAL EVERY MORNING
Qty: 30 TABLET | Refills: 0 | Status: SHIPPED | OUTPATIENT
Start: 2024-02-23 | End: 2024-03-24

## 2024-02-22 RX ORDER — HYDROCORTISONE 20 MG/1
20 TABLET ORAL EVERY MORNING
Status: DISCONTINUED | OUTPATIENT
Start: 2024-02-23 | End: 2024-02-22 | Stop reason: HOSPADM

## 2024-02-22 RX ADMIN — HYDROCORTISONE SODIUM SUCCINATE 20 MG: 100 INJECTION, POWDER, FOR SOLUTION INTRAMUSCULAR; INTRAVENOUS at 06:20

## 2024-02-22 RX ADMIN — CALCIUM CARBONATE (ANTACID) CHEW TAB 500 MG 1 TABLET: 500 CHEW TAB at 01:10

## 2024-02-22 RX ADMIN — RIFAXIMIN 550 MG: 550 TABLET ORAL at 12:57

## 2024-02-22 RX ADMIN — LEVOTHYROXINE SODIUM 112 MCG: 0.11 TABLET ORAL at 09:27

## 2024-02-22 RX ADMIN — SODIUM CHLORIDE 150 ML/HR: 9 INJECTION, SOLUTION INTRAVENOUS at 06:19

## 2024-02-22 NOTE — PLAN OF CARE
Goal Outcome Evaluation:  Plan of Care Reviewed With: patient        Progress: improving       Problem: Adult Inpatient Plan of Care  Goal: Plan of Care Review  Outcome: Ongoing, Progressing  Flowsheets (Taken 2/22/2024 0306)  Progress: improving  Plan of Care Reviewed With: patient  Goal: Patient-Specific Goal (Individualized)  Outcome: Ongoing, Progressing  Goal: Absence of Hospital-Acquired Illness or Injury  Outcome: Ongoing, Progressing  Intervention: Identify and Manage Fall Risk  Recent Flowsheet Documentation  Taken 2/22/2024 0233 by Haylee Kennedy, RN  Safety Promotion/Fall Prevention:   activity supervised   assistive device/personal items within reach   clutter free environment maintained   nonskid shoes/slippers when out of bed   room organization consistent   safety round/check completed  Taken 2/22/2024 0000 by Haylee Kennedy, RN  Safety Promotion/Fall Prevention:   activity supervised   assistive device/personal items within reach   clutter free environment maintained   nonskid shoes/slippers when out of bed   room organization consistent   safety round/check completed  Taken 2/21/2024 2250 by Haylee Kennedy, RN  Safety Promotion/Fall Prevention:   activity supervised   assistive device/personal items within reach   clutter free environment maintained   nonskid shoes/slippers when out of bed   room organization consistent   safety round/check completed  Taken 2/21/2024 2045 by Haylee Kennedy, RN  Safety Promotion/Fall Prevention:   activity supervised   assistive device/personal items within reach   clutter free environment maintained   nonskid shoes/slippers when out of bed   room organization consistent   safety round/check completed  Intervention: Prevent Skin Injury  Recent Flowsheet Documentation  Taken 2/22/2024 0233 by Haylee Kennedy, RN  Body Position: position changed independently  Skin Protection:   adhesive use limited   transparent dressing maintained   tubing/devices  free from skin contact  Taken 2/22/2024 0000 by Haylee Kennedy RN  Body Position: position changed independently  Skin Protection:   adhesive use limited   transparent dressing maintained   tubing/devices free from skin contact  Taken 2/21/2024 2250 by Haylee Kennedy RN  Body Position: position changed independently  Skin Protection:   adhesive use limited   transparent dressing maintained   tubing/devices free from skin contact  Taken 2/21/2024 2045 by Haylee Kennedy RN  Body Position: position changed independently  Skin Protection:   adhesive use limited   transparent dressing maintained   tubing/devices free from skin contact  Intervention: Prevent and Manage VTE (Venous Thromboembolism) Risk  Recent Flowsheet Documentation  Taken 2/22/2024 0233 by Haylee Kennedy RN  Activity Management: up ad theresa  Taken 2/21/2024 2250 by Haylee Kennedy RN  Activity Management: up ad theresa  Taken 2/21/2024 2045 by Haylee Kennedy RN  Activity Management: up ad theresa  Intervention: Prevent Infection  Recent Flowsheet Documentation  Taken 2/21/2024 2045 by Haylee Kennedy RN  Infection Prevention:   cohorting utilized   environmental surveillance performed   equipment surfaces disinfected   hand hygiene promoted   personal protective equipment utilized   rest/sleep promoted  Goal: Optimal Comfort and Wellbeing  Outcome: Ongoing, Progressing  Intervention: Provide Person-Centered Care  Recent Flowsheet Documentation  Taken 2/21/2024 2045 by Haylee Kennedy RN  Trust Relationship/Rapport:   care explained   questions answered   thoughts/feelings acknowledged  Goal: Readiness for Transition of Care  Outcome: Ongoing, Progressing

## 2024-02-22 NOTE — CONSULTS
[facility]astroenterology Consult    Referring Provider: Karel Arellano     PCP: Blaise Manzano PA    Reason for Consultation: Diarrhea     Chief complaint: Hypotension and decreased responsiveness     History of present illness:    Olga Osborne is a very pleasant 33 y.o. female who is admitted with hypotension and presyncope.  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   She had decreased responsiveness and EMS was contacted, hypotensive on arrival systolic in the 50s.   She has history of secondary adrenal insufficiency after bilateral adrenal gland removal secondary to pheochromocytomas.   She had a right sided adrenalectomy 10 years ago for active pheo and underwent recent left sided adrenalectomy with Dr. Ocampo at [facility] on 10/5/23.   She has had difficulty with diarrhea since that time.   She describes 3-5 loose bowel movements daily.   These are typically post prandial.   She has associated abdominal pain with cramping which is resolved after her bowel movement.   She denies nocturnal stools.   She denies any blood in the stool.       She has had a remote colonoscopy and EGD in 2011 and states she was diagnosed with H. Pylori at that time.      She has had adjustment of her oral hydrocortisone dosing due to concerns of insufficiency with recurrent hypotension, intermittent nausea and vomiting.       Allergies:  Patient has no known allergies.    Scheduled Meds:  hydrocortisone sodium succinate, 20 mg, Intravenous, Q6H  levothyroxine, 112 mcg, Oral, Daily  sodium chloride, 10 mL, Intravenous, Q12H       Infusions:  sodium chloride, 150 mL/hr, Last Rate: 150 mL/hr (02/22/24 0619)      PRN Meds:    acetaminophen **OR** acetaminophen **OR** acetaminophen    calcium carbonate    ondansetron ODT **OR** ondansetron    sodium chloride    sodium chloride    sodium chloride    Home Meds:  Medications Prior to Admission   Medication Sig Dispense Refill Last Dose    hydrocortisone  (CORTEF) 5 MG tablet Take 2 tablets by mouth Every Morning.   2/21/2024 at 0700    hydrocortisone (CORTEF) 5 MG tablet Take 1 tablet by mouth Daily With Lunch.   2/21/2024 at 1200    levothyroxine (SYNTHROID, LEVOTHROID) 112 MCG tablet Take 1 tablet by mouth Daily.   2/21/2024    ondansetron ODT (ZOFRAN-ODT) 4 MG disintegrating tablet DISSOLVE 1 TABLET ON THE TONGUE EVERY 8 HOURS AS NEEDED FOR NAUSEA OR VOMITING   2/21/2024    Solu-CORTEF 100 MG injection TO BE USED FOR ANY EMERGENCY ADDISONIAN CRISIS IN THE MUSCLE BY PATIENT OR BY HOUSEHOLD   2/21/2024    hydrocortisone (CORTEF) 5 MG tablet Take 1 tablet by mouth Daily. Takes at 1700       ondansetron (ZOFRAN) 4 MG tablet Take 1 tablet by mouth Every 8 (Eight) Hours As Needed for Nausea or Vomiting. 10 tablet 0      ROS: Review of Systems   Constitutional:  Positive for fatigue.   HENT: Negative.     Eyes: Negative.    Respiratory: Negative.     Cardiovascular: Negative.    Gastrointestinal:  Positive for abdominal pain and diarrhea.   Endocrine: Negative.    Genitourinary: Negative.    Musculoskeletal: Negative.    Skin: Negative.    Neurological:  Positive for dizziness, syncope and light-headedness.   Hematological: Negative.    Psychiatric/Behavioral: Negative.         PAST MED HX:  Past Medical History:   Diagnosis Date    Abnormal uterine bleeding     Adrenal tumor     left adrenal gland    Hyperthyroidism 2015    Hypothyroidism     Kidney stone 2015    Ovarian cyst     right side       PAST SURG HX:  Past Surgical History:   Procedure Laterality Date    ADRENAL GLAND SURGERY      THYROIDECTOMY  04/28/2016       FAM HX:  Family History   Problem Relation Age of Onset    Hypertension Father     Diabetes Father     Clotting disorder Father     Hypertension Mother     Diabetes Mother     Asthma Mother     Developmental Disability Son         ASD diagnosis level 2/3       SOC HX:  Social History     Socioeconomic History    Marital status:    Tobacco Use  "   Smoking status: Never    Smokeless tobacco: Never   Vaping Use    Vaping Use: Never used   Substance and Sexual Activity    Alcohol use: No    Drug use: No    Sexual activity: Yes     Partners: Male     Birth control/protection: Condom       PHYSICAL EXAM  /84 (BP Location: Left arm, Patient Position: Lying)   Pulse 90   Temp 97.8 °F (36.6 °C) (Oral)   Resp 18   Ht 160 cm (63\")   Wt 79.2 kg (174 lb 9.6 oz)   LMP 02/14/2024 (Approximate)   SpO2 100%   Breastfeeding No   BMI 30.93 kg/m²   Wt Readings from Last 3 Encounters:   02/21/24 79.2 kg (174 lb 9.6 oz)   01/19/24 80.8 kg (178 lb 3.2 oz)   01/05/24 82.6 kg (182 lb)   ,body mass index is 30.93 kg/m².  Physical Exam  Constitutional:       General: She is not in acute distress.     Appearance: She is not ill-appearing.      Comments: Very pleasant to speak with, no acute distress   HENT:      Mouth/Throat:      Mouth: Mucous membranes are moist.   Eyes:      General: No scleral icterus.  Cardiovascular:      Rate and Rhythm: Normal rate and regular rhythm.   Pulmonary:      Effort: Pulmonary effort is normal. No respiratory distress.   Abdominal:      General: Bowel sounds are normal. There is no distension.      Palpations: Abdomen is soft.      Tenderness: There is no abdominal tenderness.   Skin:     General: Skin is warm and dry.   Neurological:      Mental Status: She is alert and oriented to person, place, and time.   Psychiatric:         Behavior: Behavior normal.         Results Review:   I reviewed the patient's new clinical results.    Lab Results   Component Value Date    WBC 5.75 02/22/2024    HGB 11.5 (L) 02/22/2024    HGB 12.8 02/21/2024    HGB 12.1 04/11/2022    HCT 34.2 02/22/2024    MCV 91.7 02/22/2024     02/22/2024       No results found for: \"INR\"    Lab Results   Component Value Date    GLUCOSE 85 02/22/2024    BUN 8 02/22/2024    CREATININE 0.73 02/22/2024    EGFRIFNONA 86 11/01/2017    BCR 11.0 02/22/2024     " 02/22/2024    K 3.5 02/22/2024    CO2 21.0 (L) 02/22/2024    CALCIUM 8.1 (L) 02/22/2024    ALBUMIN 3.6 02/22/2024    ALKPHOS 35 (L) 02/22/2024    BILITOT 0.4 02/22/2024    ALT 8 02/22/2024    AST 11 02/22/2024      Latest Reference Range & Units 02/21/24 13:17   TSH Baseline 0.270 - 4.200 uIU/mL 0.053 (L)   Free T4 0.93 - 1.70 ng/dL 2.22 (H)     Toxigenic C. difficile by PCR  Not Detected Not Detected     Campylobacter  Not Detected  Not Detected  Plesiomonas shigelloides  Not Detected  Not Detected  Salmonella  Not Detected  Not Detected  Vibrio  Not Detected  Not Detected  Vibrio cholerae  Not Detected  Not Detected  Yersinia enterocolitica  Not Detected  Not Detected  Enteroaggregative E. coli (EAEC)  Not Detected  Not Detected  Enteropathogenic E. coli (EPEC)  Not Detected  Not Detected  Enterotoxigenic E. coli (ETEC) lt/st  Not Detected  Not Detected  Shiga-like toxin-producing E. coli (STEC) stx1/stx2  Not Detected  Not Detected  Shigella/Enteroinvasive E. coli (EIEC)  Not Detected  Not Detected  Cryptosporidium  Not Detected  Not Detected  Cyclospora cayetanensis  Not Detected  Not Detected  Entamoeba histolytica  Not Detected  Not Detected  Giardia lamblia  Not Detected  Not Detected  Adenovirus F40/41  Not Detected  Not Detected  Astrovirus  Not Detected  Not Detected  Norovirus GI/GII  Not Detected  Not Detected  Rotavirus A  Not Detected  Not Detected  Sapovirus (I, II, IV or V)  Not Detected  Not Detected  Resulting Agency    ASSESSMENTS/PLANS    IBS-D  Chronic diarrhea, related to above and worsened after left adrenalectomy in October 2023  History of H. Pylori   Adrenal insufficiency, chronic s/p bilateral adrenalectomy for pheochromocytoma   History of graves disease s/p remote thyroidectomy.    On Levothyroxine 112 mcg daily.   TSH currently 0.053.       Suspect her diarrhea is multifactorial.   She meets Kameron IV criteria for IBS-D.   Her diarrhea however can also be related to her adrenal  insufficieny and iatrogenic hyperthyroidism.   She denies nocturnal stools nor bleeding.   She has had weight loss since her surgery but this is in part intentional from elective dietary changes.       C. Difficile toxin and GI panel PCR are negative.       >> Recommend Xifaxan 550 mg TID for 14 days   >> Will check Strongyloides   >> Will check TTG IgA and IgA for celiac disease  >> Discussed options of inpatient colonoscopy and EGD but patient would like to try medical management first.     >> Adjustment of her Levothyroxine dose and PO hydrocortisone per primary team     Follow up outpatient with [facility] in 3-4 weeks.       I discussed the patient's findings and my recommendations with patient    HEIDE Love  02/22/24  08:36 EST

## 2024-02-22 NOTE — CASE MANAGEMENT/SOCIAL WORK
Discharge Planning Assessment  [facility]     Patient Name: Olga Osborne  MRN: [id number]  Today's Date: 2/22/2024    Admit Date: 2/21/2024    Plan: home   Discharge Needs Assessment       Row Name 02/22/24 1128       Living Environment    People in Home child(caren), dependent;spouse    Current Living Arrangements home    Potentially Unsafe Housing Conditions none    In the past 12 months has the electric, gas, oil, or water company threatened to shut off services in your home? No    Primary Care Provided by self    Provides Primary Care For child(caren)    Family Caregiver if Needed none    Quality of Family Relationships supportive    Able to Return to Prior Arrangements yes       Resource/Environmental Concerns    Resource/Environmental Concerns none    Transportation Concerns none       Transportation Needs    In the past 12 months, has lack of transportation kept you from medical appointments or from getting medications? no    In the past 12 months, has lack of transportation kept you from meetings, work, or from getting things needed for daily living? No       Food Insecurity    Within the past 12 months, you worried that your food would run out before you got the money to buy more. Never true    Within the past 12 months, the food you bought just didn't last and you didn't have money to get more. Never true       Transition Planning    Patient/Family Anticipates Transition to home with family    Patient/Family Anticipated Services at Transition none    Transportation Anticipated family or friend will provide       Discharge Needs Assessment    Readmission Within the Last 30 Days no previous admission in last 30 days    Equipment Currently Used at Home none    Concerns to be Addressed no discharge needs identified;discharge planning    Anticipated Changes Related to Illness none    Equipment Needed After Discharge none                   Discharge Plan       Row Name 02/22/24 1129       Plan    Plan home     Patient/Family in Agreement with Plan yes    Plan Comments Met with Mrs. Osborne at the bedside to initiate discharge planning. Mrs. Osborne shares a [facility] home with her  and child. She is independent with activities of daily living and works full-time. She has no DME, home health, or outpatient services. Her primary care provider is Blaise Manzano. She denies problems with accessing medical care or obtaining medications. Her plan is home with , and he will transport. No discharge needs were identified today.  will continue to follow plan of care and assist with discharge planning as needed.    Final Discharge Disposition Code 01 - home or self-care                  Continued Care and Services - Admitted Since 2/21/2024    Coordination has not been started for this encounter.          Demographic Summary       Row Name 02/22/24 1127       General Information    Admission Type observation    Arrived From home    Referral Source admission list    Reason for Consult discharge planning    Preferred Language English       Contact Information    Permission Granted to Share Info With     Contact Information Obtained for     Contact Information Comments Reid Osborne Spouse   [phone]                   Functional Status       Row Name 02/22/24 1127       Functional Status    Usual Activity Tolerance excellent    Current Activity Tolerance good       Physical Activity    On average, how many days per week do you engage in moderate to strenuous exercise (like a brisk walk)? 5 days    On average, how many minutes do you engage in exercise at this level? 30 min    Number of minutes of exercise per week 150       Assessment of Health Literacy    How often do you have someone help you read hospital materials? Never    How often do you have problems learning about your medical condition because of difficulty understanding written information? Never    How confident are  you filling out medical forms by yourself? Extremely    Health Literacy Good       Functional Status, IADL    Medications independent    Meal Preparation independent    Housekeeping independent    Laundry independent    Shopping independent       Mental Status    General Appearance WDL WDL       Mental Status Summary    Recent Changes in Mental Status/Cognitive Functioning no changes       Employment/    Employment Status employed full-time                   Psychosocial    No documentation.                  Abuse/Neglect    No documentation.                  Legal    No documentation.                  Substance Abuse    No documentation.                  Patient Forms    No documentation.                     Jessei Staley RN

## 2024-02-22 NOTE — DISCHARGE SUMMARY
[facility] Medicine Services  DISCHARGE SUMMARY    Patient Name: Olga Osborne  : 1990  MRN: [id number]    Date of Admission: 2024  1:09 PM  Date of Discharge:  2024  Primary Care Physician: Blaise Manzano PA    Consults       Date and Time Order Name Status Description    2024 10:23 PM Inpatient Gastroenterology Consult Completed             Hospital Course     Presenting Problem: Diarrhea, Near syncope, hypotension     Active Hospital Problems   No active problems to display.      Resolved Hospital Problems    Diagnosis Date Resolved POA    **Hypotension [I95.9] 2024 Yes          Hospital Course:  Olga Osborne is a 33 y.o. female admitted on the evening of  following near syncope episode with acute on chronic diarrhea and history of adrenal insufficiency. GI consulted for evaluation and patient has been seen this AM, plans are to discharge her home today with xifaxan Rx and outpatient follow up for consideration of possible EGD/Colonoscopy if symptoms persist. Patient was provided with sample of xifaxan by GI and coupon card as well. Patient has a history of adrenal insufficiency, I have called and spoke with her regular endocrinologist, Dr. Foote at [facility] concerning stress dosing of her steroids and will order. Patient to follow up with him in the next 1-2 weeks along with her regular PCP. Patient also to follow up with GI as well in the next 3-4 weeks for follow up. Patient to be discharged home.     Syncope/ near syncope  Hypotension  Adrenal Insufficiency  -lactate on arrival 2.1 now, 1.0  -given 3L of NS in ED  - symptoms now resolved   - Stress dosing of 40 mg of solu cortef at home per endocrinology recommendations       Acute on Chronic Diarrhea   -negative stool studies   - plans for xifaxan for next 3 weeks and given samples as well by GI today  - Follow up for possible future EGD/Colonoscopy outpatient.       Hypothyroidism  - Continued adjustment with her endocrinologist      Elevated Creatnine   -cr 1.33, repeat in am after IVF  - Resolved      Discharge Follow Up Recommendations for outpatient labs/diagnostics:   Follow up with PCP and Endocrinology in next 1-2 weeks   Follow up with GI in next 3-4 weeks     Day of Discharge     HPI:   Patient is a 32 yo F seen and examined by me this AM,  at bedside and updated, she reports feels much better this AM, no current acute complaints or problems, discussed with GI and plans for d/c to home this afternoon.       Vital Signs:   Temp:  [97.5 °F (36.4 °C)-98 °F (36.7 °C)] 97.8 °F (36.6 °C)  Heart Rate:  [51-93] 90  Resp:  [14-18] 18  BP: ()/(40-84) 120/84      Physical Exam:  Constitutional: No acute distress, awake, alert, on RA and resting comfortably in bed.   HENT: NCAT, mucous membranes moist  Respiratory: Clear to auscultation bilaterally, respiratory effort normal   Cardiovascular: RRR, no murmurs, rubs, or gallops  Gastrointestinal: Positive bowel sounds, soft, nontender, nondistended  Musculoskeletal: No bilateral ankle edema  Psychiatric: Appropriate affect, cooperative  Neurologic: Oriented x 3, strength symmetric in all extremities, Cranial Nerves grossly intact to confrontation, speech clear  Skin: No rashes      Pertinent  and/or Most Recent Results     LAB RESULTS:      Lab 02/22/24  0421 02/21/24  1712 02/21/24  1317   WBC 5.75  --  7.73   HEMOGLOBIN 11.5*  --  12.8   HEMATOCRIT 34.2  --  36.8   PLATELETS 260  --  285   NEUTROS ABS 4.43  --  4.68   IMMATURE GRANS (ABS) 0.02  --  0.02   LYMPHS ABS 0.98  --  1.95   MONOS ABS 0.28  --  0.66   EOS ABS 0.02  --  0.37   MCV 91.7  --  91.3   PROCALCITONIN  --   --  0.06   LACTATE  --  1.0 2.1*         Lab 02/22/24  0421 02/21/24  1317   SODIUM 142 137   POTASSIUM 3.5 3.5   CHLORIDE 111* 104   CO2 21.0* 22.0   ANION GAP 10.0 11.0   BUN 8 11   CREATININE 0.73 1.33*   EGFR 110.8 54.0*   GLUCOSE 85 96    CALCIUM 8.1* 8.5*   MAGNESIUM  --  1.9   TSH  --  0.053*         Lab 02/22/24  0421 02/21/24  1317   TOTAL PROTEIN 5.6* 6.0   ALBUMIN 3.6 3.8   GLOBULIN 2.0 2.2   ALT (SGPT) 8 9   AST (SGOT) 11 15   BILIRUBIN 0.4 0.5   ALK PHOS 35* 37*         Lab 02/21/24  1317   HSTROP T 8                 Brief Urine Lab Results  (Last result in the past 365 days)        Color   Clarity   Blood   Leuk Est   Nitrite   Protein   CREAT   Urine HCG        02/21/24 1528               Negative             Microbiology Results (last 10 days)       Procedure Component Value - Date/Time    Gastrointestinal Panel, PCR - Stool, Per Rectum [[id number]]  (Normal) Collected: 02/22/24 0632    Lab Status: Final result Specimen: Stool from Per Rectum Updated: 02/22/24 0859     Campylobacter Not Detected     Plesiomonas shigelloides Not Detected     Salmonella Not Detected     Vibrio Not Detected     Vibrio cholerae Not Detected     Yersinia enterocolitica Not Detected     Enteroaggregative E. coli (EAEC) Not Detected     Enteropathogenic E. coli (EPEC) Not Detected     Enterotoxigenic E. coli (ETEC) lt/st Not Detected     Shiga-like toxin-producing E. coli (STEC) stx1/stx2 Not Detected     Shigella/Enteroinvasive E. coli (EIEC) Not Detected     Cryptosporidium Not Detected     Cyclospora cayetanensis Not Detected     Entamoeba histolytica Not Detected     Giardia lamblia Not Detected     Adenovirus F40/41 Not Detected     Astrovirus Not Detected     Norovirus GI/GII Not Detected     Rotavirus A Not Detected     Sapovirus (I, II, IV or V) Not Detected    Clostridioides difficile Toxin - Stool, Per Rectum [[id number]]  (Normal) Collected: 02/22/24 0632    Lab Status: Final result Specimen: Stool from Per Rectum Updated: 02/22/24 0827    Narrative:      The following orders were created for panel order Clostridioides difficile Toxin - Stool, Per Rectum.  Procedure                               Abnormality         Status                     ---------                                -----------         ------                     Clostridioides difficile...[[id number]]  Normal              Final result                 Please view results for these tests on the individual orders.    Clostridioides difficile Toxin, PCR - Stool, Per Rectum [[id number]]  (Normal) Collected: 02/22/24 0632    Lab Status: Final result Specimen: Stool from Per Rectum Updated: 02/22/24 0827     Toxigenic C. difficile by PCR Not Detected    Narrative:      The result indicates the absence of toxigenic C. difficile from stool specimen.             No radiology results for the last 10 days                Plan for Follow-up of Pending Labs/Results: Follow up with GI as an outpatient   Pending Labs       Order Current Status    IgA In process    Strongyloides Antibody IgG, YOMAIRA In process    Tissue Transglutaminase, IgA In process          Discharge Details        Discharge Medications        New Medications        Instructions Start Date   riFAXIMin 550 MG tablet  Commonly known as: XIFAXAN   550 mg, Oral, Every 8 Hours Scheduled             Changes to Medications        Instructions Start Date   hydrocortisone 10 MG tablet  Commonly known as: CORTEF  What changed: You were already taking a medication with the same name, and this prescription was added. Make sure you understand how and when to take each.   10 mg, Oral, Daily With Lunch      hydrocortisone 10 MG tablet  Commonly known as: CORTEF  What changed: You were already taking a medication with the same name, and this prescription was added. Make sure you understand how and when to take each.   10 mg, Oral, Daily With Dinner      hydrocortisone 20 MG tablet  Commonly known as: CORTEF  What changed:   medication strength  how much to take  how to take this  when to take this  additional instructions   20 mg, Oral, Every Morning   Start Date: February 23, 2024            Continue These Medications        Instructions Start Date   levothyroxine  112 MCG tablet  Commonly known as: SYNTHROID, LEVOTHROID   112 mcg, Oral, Every Early Morning      Solu-CORTEF 100 MG injection  Generic drug: Hydrocortisone Sod Suc (PF)   TO BE USED FOR ANY EMERGENCY ADDISONIAN CRISIS IN THE MUSCLE BY PATIENT OR BY HOUSEHOLD             Stop These Medications      ondansetron ODT 4 MG disintegrating tablet  Commonly known as: ZOFRAN-ODT              No Known Allergies      Discharge Disposition:  Home or Self Care    Diet:  Hospital:  Diet Order   Procedures    Diet: Regular/House Diet; Texture: Regular Texture (IDDSI 7); Fluid Consistency: Thin (IDDSI 0)            Activity:  Resume previous     Restrictions or Other Recommendations:  Follow up appointments as below        CODE STATUS:    Code Status and Medical Interventions:   Ordered at: 02/21/24 1752     Code Status (Patient has no pulse and is not breathing):    CPR (Attempt to Resuscitate)     Medical Interventions (Patient has pulse or is breathing):    Full Support       No future appointments.    Additional Instructions for the Follow-ups that You Need to Schedule       Discharge Follow-up with PCP   As directed       Currently Documented PCP:    Blaise Manzano PA    PCP Phone Number:    [phone]     Follow Up Details: Follow up with PCP in 1 week        Discharge Follow-up with Specified Provider: Follow up with Dr[facility] Foote [facility]   As directed      To: Follow up with Dr. Foote [facility] in 1-2 weeks        Discharge Follow-up with Specified Provider: Follow up with GI in next 1 month   As directed      To: Follow up with GI in next 1 month                      JOESPH Arellano DO  02/22/24      Time Spent on Discharge:  I spent  35  minutes on this discharge activity which included: face-to-face encounter with the patient, reviewing the data in the system, coordination of the care with the nursing staff as well as  consultants, documentation, and entering orders.

## 2024-02-22 NOTE — PLAN OF CARE
Problem: Adult Inpatient Plan of Care  Goal: Plan of Care Review  Outcome: Ongoing, Progressing  Flowsheets (Taken 2/21/2024 1901)  Plan of Care Reviewed With: patient  Goal: Patient-Specific Goal (Individualized)  Outcome: Ongoing, Progressing  Goal: Absence of Hospital-Acquired Illness or Injury  Outcome: Ongoing, Progressing  Goal: Optimal Comfort and Wellbeing  Outcome: Ongoing, Progressing  Goal: Readiness for Transition of Care  Outcome: Ongoing, Progressing   Goal Outcome Evaluation:  Plan of Care Reviewed With: patient

## 2024-02-22 NOTE — OUTREACH NOTE
Prep Survey      Flowsheet Row Responses   [facility] patient discharged from? [place]   Is LACE score < 7 ? Yes   Eligibility [facility]   Date of Admission 02/21/24   Date of Discharge 02/22/24   Discharge Disposition Home or Self Care   Discharge diagnosis hypotension   Does the patient have one of the following disease processes/diagnoses(primary or secondary)? Other   Does the patient have Home health ordered? No   Is there a DME ordered? No   Prep survey completed? Yes            Lindsay BERNARD - Registered Nurse

## 2024-02-23 ENCOUNTER — TRANSITIONAL CARE MANAGEMENT TELEPHONE ENCOUNTER (OUTPATIENT)
Dept: CALL CENTER | Facility: HOSPITAL | Age: 34
End: 2024-02-23
Payer: COMMERCIAL

## 2024-02-23 LAB — TTG IGA SER-ACNC: <2 U/ML (ref 0–3)

## 2024-02-23 NOTE — OUTREACH NOTE
Call Center TCM Note      Flowsheet Row Responses   [facility] patient discharged from? [place]   Does the patient have one of the following disease processes/diagnoses(primary or secondary)? Other   TCM attempt successful? Yes   Call start time 1209   Call end time 1211   Discharge diagnosis hypotension   Person spoke with today (if not patient) and relationship patient   Meds reviewed with patient/caregiver? Yes   Is the patient having any side effects they believe may be caused by any medication additions or changes? No   Does the patient have all medications ordered at discharge? Yes   Is the patient taking all medications as directed (includes completed medication regime)? Yes   Comments Patient has a hospital followup with Endo and PCP on 2/27/2024   Does the patient have an appointment with their PCP within 7-14 days of discharge? Yes   Psychosocial issues? No   Did the patient receive a copy of their discharge instructions? Yes   Nursing interventions Reviewed instructions with patient   What is the patient's perception of their health status since discharge? Improving   Is the patient/caregiver able to teach back signs and symptoms related to disease process for when to call PCP? Yes   Is the patient/caregiver able to teach back signs and symptoms related to disease process for when to call 911? Yes   Is the patient/caregiver able to teach back the hierarchy of who to call/visit for symptoms/problems? PCP, Specialist, Home health nurse, Urgent Care, ED, 911 Yes   If the patient is a current smoker, are they able to teach back resources for cessation? Not a smoker   TCM call completed? Yes   Wrap up additional comments Patient is doing well. She is monitoring her B/P every couple of hours. She has no needs at this time.   Call end time 1211   Would this patient benefit from a Referral to Amb Social Work? No   Is the patient interested in additional calls from an ambulatory ? No             Forest Vasquez RN    2/23/2024, 12:11 EST

## 2024-02-26 ENCOUNTER — TELEPHONE (OUTPATIENT)
Dept: GASTROENTEROLOGY | Facility: CLINIC | Age: 34
End: 2024-02-26
Payer: COMMERCIAL

## 2024-02-26 LAB — STRONGYLOIDES IGG SER QL IA: NEGATIVE

## 2024-02-26 NOTE — TELEPHONE ENCOUNTER
Patient called she has had a 6 day supply of xifaxan and was advised to call in to receive the other 8 day if it was working for her. Reached out to [facility] to get samples.

## 2024-02-27 ENCOUNTER — OFFICE VISIT (OUTPATIENT)
Dept: FAMILY MEDICINE CLINIC | Facility: CLINIC | Age: 34
End: 2024-02-27
Payer: COMMERCIAL

## 2024-02-27 VITALS
HEART RATE: 67 BPM | SYSTOLIC BLOOD PRESSURE: 124 MMHG | RESPIRATION RATE: 14 BRPM | BODY MASS INDEX: 30.19 KG/M2 | DIASTOLIC BLOOD PRESSURE: 80 MMHG | OXYGEN SATURATION: 99 % | TEMPERATURE: 97.3 F | WEIGHT: 170.4 LBS | HEIGHT: 63 IN

## 2024-02-27 DIAGNOSIS — G44.209 ACUTE NON INTRACTABLE TENSION-TYPE HEADACHE: Primary | ICD-10-CM

## 2024-02-27 RX ORDER — CYCLOBENZAPRINE HCL 10 MG
10 TABLET ORAL NIGHTLY
Qty: 21 TABLET | Refills: 0 | Status: SHIPPED | OUTPATIENT
Start: 2024-02-27

## 2024-02-27 NOTE — PROGRESS NOTES
Subjective   Olga Osborne is a 33 y.o. female  Headache      History of Present Illness  Patient is a pleasant 33-year-old white female who comes in for follow-up of hospital admission for syncope hypotension adrenal insufficiency and chronic diarrhea patient states she feels much better since coming out of the hospital show still has a little frontal headache has been consistent since discharge denies any blurry vision      The following portions of the patient's history were reviewed and updated as appropriate: allergies, current medications, past social history and problem list    Review of Systems   Constitutional:  Negative for fatigue and unexpected weight change.   Respiratory:  Negative for cough, chest tightness and shortness of breath.    Cardiovascular:  Negative for chest pain, palpitations and leg swelling.   Gastrointestinal:  Negative for nausea.   Skin:  Negative for color change and rash.   Neurological:  Negative for dizziness, syncope, weakness and headaches.       Objective     Vitals:    02/27/24 1433   BP: 124/80   Pulse: 67   Resp: 14   Temp: 97.3 °F (36.3 °C)   SpO2: 99%       Physical Exam  Vitals and nursing note reviewed.   Constitutional:       General: She is not in acute distress.     Appearance: Normal appearance. She is well-developed. She is not ill-appearing, toxic-appearing or diaphoretic.   HENT:      Head: Normocephalic and atraumatic.      Right Ear: External ear normal.      Left Ear: External ear normal.   Eyes:      Conjunctiva/sclera: Conjunctivae normal.      Pupils: Pupils are equal, round, and reactive to light.   Neck:      Thyroid: No thyromegaly.      Vascular: No carotid bruit or JVD.   Cardiovascular:      Rate and Rhythm: Normal rate and regular rhythm.      Pulses: Normal pulses.      Heart sounds: Normal heart sounds. No murmur heard.  Pulmonary:      Effort: Pulmonary effort is normal. No respiratory distress.      Breath sounds: Normal breath sounds.    Abdominal:      General: Bowel sounds are normal.      Palpations: Abdomen is soft. There is no mass.      Tenderness: There is no abdominal tenderness.   Musculoskeletal:         General: No swelling. Normal range of motion.      Cervical back: Normal range of motion and neck supple.   Lymphadenopathy:      Cervical: No cervical adenopathy.   Skin:     General: Skin is warm and dry.      Findings: No lesion or rash.   Neurological:      Mental Status: She is alert and oriented to person, place, and time.      Cranial Nerves: No cranial nerve deficit.      Sensory: No sensory deficit.      Motor: No weakness.      Coordination: Coordination normal.      Gait: Gait normal.      Deep Tendon Reflexes: Reflexes are normal and symmetric.   Psychiatric:         Mood and Affect: Mood normal.         Behavior: Behavior normal.         Thought Content: Thought content normal.         Judgment: Judgment normal.         Assessment & Plan     Diagnoses and all orders for this visit:    1. Acute non intractable tension-type headache (Primary)  -     cyclobenzaprine (FLEXERIL) 10 MG tablet; Take 1 tablet by mouth Every Night.  Dispense: 21 tablet; Refill: 0    Follow-up with Millicent    I spent 15 minutes in patient care: Reviewing records prior to the visit, examining the patient, entering orders and documentation    Part of this note may be an electronic transcription/translation of spoken language to printed text using the Dragon Dictation System.

## 2024-04-24 ENCOUNTER — OFFICE VISIT (OUTPATIENT)
Dept: GASTROENTEROLOGY | Facility: CLINIC | Age: 34
End: 2024-04-24
Payer: COMMERCIAL

## 2024-04-24 VITALS
HEIGHT: 63 IN | DIASTOLIC BLOOD PRESSURE: 86 MMHG | OXYGEN SATURATION: 99 % | TEMPERATURE: 96.8 F | HEART RATE: 66 BPM | SYSTOLIC BLOOD PRESSURE: 120 MMHG | BODY MASS INDEX: 29.8 KG/M2 | WEIGHT: 168.2 LBS

## 2024-04-24 DIAGNOSIS — E27.40 ADRENAL INSUFFICIENCY: ICD-10-CM

## 2024-04-24 DIAGNOSIS — K52.9 CHRONIC DIARRHEA: Primary | ICD-10-CM

## 2024-04-24 DIAGNOSIS — R14.0 ABDOMINAL BLOATING: ICD-10-CM

## 2024-04-24 RX ORDER — HYDROCORTISONE 5 MG/1
TABLET ORAL
COMMUNITY
Start: 2024-04-17

## 2024-04-24 RX ORDER — PANCRELIPASE 36000; 180000; 114000 [USP'U]/1; [USP'U]/1; [USP'U]/1
72000 CAPSULE, DELAYED RELEASE PELLETS ORAL
Qty: 300 CAPSULE | Refills: 5 | Status: SHIPPED | OUTPATIENT
Start: 2024-04-24

## 2024-04-24 NOTE — PROGRESS NOTES
Follow Up      Patient Name: Olga Osborne  : 1990   MRN: [id number]     Chief Complaint:  No chief complaint on file.      History of Present Illness: Olga Osborne is a 33 y.o. female, PMH includes adrenal insufficiency, who is here today for hospital follow up.    Pt was admitted to [facility]  -  for evaluation of diarrhea, hypotension. Pt reports 3-5 loose stools chronically. Pt was seen by inpatient GI service. Gastrointestinal PCR, C difficile, Strongyloides Ab were negative. Celiac panel unremarkable. Pt was discharged with rx for Xifaxan and plans for outpatient EGD / CSY if sx persist.     Pt notes 1-2 weeks of sx improvement following course of Xifaxan. She has resumed her normal bowel pattern of 3-5 loose stools per day, associated with postprandial urgency and lower abdominal cramping. She historically has not found relief with Metamucil, OTC antidiarrheal, dietary changes. Patient denies associated fever, chills, indigestion, nausea, vomiting, constipation, hematemesis, dysphagia, hematochezia, melena, weight loss or gain, dysuria, jaundice or bruising.    Patient denies personal or FHx of PUD, H Pylori, gastritis, pancreatitis, colitis, Celiac disease, UC, Crohn's disease, colon or gastric cancers. Pt denies EtOH, tobacco, illicit substance or NSAID use.    Subjective      Review of Systems:   Review of Systems   Constitutional:  Negative for appetite change, chills, diaphoresis, fatigue, fever, unexpected weight gain and unexpected weight loss.   HENT:  Negative for drooling, facial swelling, mouth sores, rhinorrhea, sore throat, tinnitus, trouble swallowing and voice change.    Eyes: Negative.    Respiratory:  Negative for cough, chest tightness and shortness of breath.    Cardiovascular:  Negative for chest pain.   Gastrointestinal:  Positive for abdominal pain (lower cramping) and diarrhea (loose stools). Negative for blood in stool, constipation, nausea, vomiting, GERD and  indigestion.   Genitourinary:  Negative for dysuria, flank pain, hematuria and pelvic pain.   Musculoskeletal:  Negative for arthralgias and myalgias.   Skin:  Negative for color change, pallor and rash.   Neurological:  Negative for dizziness, tremors, syncope, weakness and numbness.   Psychiatric/Behavioral:  Negative for hallucinations and sleep disturbance. The patient is not nervous/anxious.    All other systems reviewed and are negative.      Medications:     Current Outpatient Medications:     levothyroxine (SYNTHROID, LEVOTHROID) 112 MCG tablet, Take 1 tablet by mouth Every Morning., Disp: , Rfl:     Solu-CORTEF 100 MG injection, TO BE USED FOR ANY EMERGENCY ADDISONIAN CRISIS IN THE MUSCLE BY PATIENT OR BY HOUSEHOLD, Disp: , Rfl:     Allergies:   No Known Allergies    Social History:   Social History     Socioeconomic History    Marital status:    Tobacco Use    Smoking status: Never    Smokeless tobacco: Never   Vaping Use    Vaping status: Never Used   Substance and Sexual Activity    Alcohol use: No    Drug use: No    Sexual activity: Yes     Partners: Male     Birth control/protection: Condom        Surgical History:   Past Surgical History:   Procedure Laterality Date    ADRENAL GLAND SURGERY      THYROIDECTOMY  04/28/2016        Medical History:   Past Medical History:   Diagnosis Date    Abnormal uterine bleeding     Adrenal tumor     left adrenal gland    History of medical problems 10-5-2023    Adrenal Insufficiency    Hyperthyroidism 2015    Hypothyroidism     Kidney stone 2015    Ovarian cyst     right side        Objective     Physical Exam:  Vital Signs: There were no vitals filed for this visit.  There is no height or weight on file to calculate BMI.     Physical Exam  Vitals and nursing note reviewed.   Constitutional:       Appearance: Normal appearance. She is normal weight. She is not ill-appearing or diaphoretic.      Comments: BMI 29.80. Pleasantly conversant.    HENT:      Head:  Normocephalic and atraumatic.      Right Ear: External ear normal.      Left Ear: External ear normal.      Nose: Nose normal.      Mouth/Throat:      Mouth: Mucous membranes are moist.      Pharynx: Oropharynx is clear.   Eyes:      Conjunctiva/sclera: Conjunctivae normal.      Pupils: Pupils are equal, round, and reactive to light.   Neck:      Thyroid: No thyromegaly.   Cardiovascular:      Rate and Rhythm: Normal rate and regular rhythm.      Pulses: Normal pulses.      Heart sounds: Normal heart sounds.   Pulmonary:      Effort: Pulmonary effort is normal.      Breath sounds: Normal breath sounds.   Abdominal:      General: Abdomen is flat. Bowel sounds are normal. There is no distension.      Tenderness: There is no abdominal tenderness.   Musculoskeletal:         General: Normal range of motion.      Cervical back: Normal range of motion and neck supple.   Skin:     General: Skin is warm and dry.   Neurological:      General: No focal deficit present.      Mental Status: She is oriented to person, place, and time.   Psychiatric:         Mood and Affect: Mood normal.         Assessment / Plan      Assessment/Plan:   There are no diagnoses linked to this encounter.     Chronic diarrhea  Adrenal insufficiency   - rx for Creon sent to pharmacy, pt advised to call clinic in 1-2 weeks with sx update   - previous office notes, hospital records, labs, imaging reports reviewed with patient    - schedule for EGD / CSY   - follow up in clinic after completion of above studies   - call clinic at any time for questions or new / worsened sx    Follow Up:   Return in about 6 weeks (around 6/5/2024).    Plan of care reviewed with the patient at the conclusion of today's visit.  Education was provided regarding diagnosis, management, and any prescribed or recommended OTC medications.  Patient verbalized understanding of and agreement with management plan.     NOTE TO PATIENT: The 21st Century Cures Act makes medical notes  like these available to patients in the interest of transparency. However, be advised this is a medical document. It is intended as peer to peer communication. It is written in medical language and may contain abbreviations or verbiage that are unfamiliar. It may appear blunt or direct. Medical documents are intended to carry relevant information, facts as evident, and the clinical opinion of the practitioner.     Time Statement:   Discussed plan of care in detail with patient today. Patient verbally understands and agrees. I have spent 30 minutes reviewing available diagnostics, obtaining history, examining the patient, developing a treatment plan, and educating the patient on disease process and plan of care.     Jovanna Mora PA-C   [facility]

## 2024-05-09 ENCOUNTER — OUTSIDE FACILITY SERVICE (OUTPATIENT)
Dept: GASTROENTEROLOGY | Facility: CLINIC | Age: 34
End: 2024-05-09
Payer: COMMERCIAL

## 2024-05-09 PROCEDURE — 88305 TISSUE EXAM BY PATHOLOGIST: CPT | Performed by: INTERNAL MEDICINE

## 2024-05-09 PROCEDURE — 45380 COLONOSCOPY AND BIOPSY: CPT | Performed by: INTERNAL MEDICINE

## 2024-05-09 PROCEDURE — 43239 EGD BIOPSY SINGLE/MULTIPLE: CPT | Performed by: INTERNAL MEDICINE

## 2024-05-09 PROCEDURE — 88342 IMHCHEM/IMCYTCHM 1ST ANTB: CPT | Performed by: INTERNAL MEDICINE

## 2024-05-10 ENCOUNTER — LAB REQUISITION (OUTPATIENT)
Dept: LAB | Facility: HOSPITAL | Age: 34
End: 2024-05-10
Payer: COMMERCIAL

## 2024-05-10 DIAGNOSIS — K25.9 GASTRIC ULCER, UNSPECIFIED AS ACUTE OR CHRONIC, WITHOUT HEMORRHAGE OR PERFORATION: ICD-10-CM

## 2024-05-10 DIAGNOSIS — K44.9 DIAPHRAGMATIC HERNIA WITHOUT OBSTRUCTION OR GANGRENE: ICD-10-CM

## 2024-05-10 DIAGNOSIS — K52.9 NONINFECTIVE GASTROENTERITIS AND COLITIS, UNSPECIFIED: ICD-10-CM

## 2024-05-10 DIAGNOSIS — R19.7 DIARRHEA, UNSPECIFIED: ICD-10-CM

## 2024-05-10 DIAGNOSIS — K64.8 OTHER HEMORRHOIDS: ICD-10-CM

## 2024-05-10 DIAGNOSIS — K29.00 ACUTE GASTRITIS WITHOUT BLEEDING: ICD-10-CM

## 2024-05-14 DIAGNOSIS — K22.10 EROSIVE ESOPHAGITIS: Primary | ICD-10-CM

## 2024-05-14 LAB — REF LAB TEST METHOD: NORMAL

## 2024-05-14 RX ORDER — ESOMEPRAZOLE MAGNESIUM 40 MG/1
40 CAPSULE, DELAYED RELEASE ORAL 2 TIMES DAILY
Qty: 60 CAPSULE | Refills: 5 | Status: SHIPPED | OUTPATIENT
Start: 2024-05-14

## 2024-05-15 NOTE — PROGRESS NOTES
Per Dr. Sutherland:  There was evidence of erosive esophagitis. I escribed Nexium twice daily.  All the colon biopsies were unremarkable.

## 2024-05-16 ENCOUNTER — PRIOR AUTHORIZATION (OUTPATIENT)
Dept: GASTROENTEROLOGY | Facility: CLINIC | Age: 34
End: 2024-05-16
Payer: COMMERCIAL

## 2024-05-16 NOTE — TELEPHONE ENCOUNTER
Key: [id number] - PA Case ID: [id number] - Rx #: [id number] help? Call us at [phone]  Status  Sent to [facility]  Drug  Esomeprazole Magnesium 40MG dr capsules  Form  [employer] Electronic Prior Authorization Form (2017 NCPDP)

## 2024-08-26 ENCOUNTER — OFFICE VISIT (OUTPATIENT)
Dept: FAMILY MEDICINE CLINIC | Facility: CLINIC | Age: 34
End: 2024-08-26
Payer: COMMERCIAL

## 2024-08-26 VITALS
HEIGHT: 63 IN | RESPIRATION RATE: 18 BRPM | SYSTOLIC BLOOD PRESSURE: 106 MMHG | OXYGEN SATURATION: 98 % | WEIGHT: 160 LBS | HEART RATE: 70 BPM | BODY MASS INDEX: 28.35 KG/M2 | DIASTOLIC BLOOD PRESSURE: 78 MMHG | TEMPERATURE: 98.1 F

## 2024-08-26 DIAGNOSIS — J02.9 SORE THROAT: ICD-10-CM

## 2024-08-26 DIAGNOSIS — J01.90 ACUTE NON-RECURRENT SINUSITIS, UNSPECIFIED LOCATION: ICD-10-CM

## 2024-08-26 DIAGNOSIS — R05.9 COUGH, UNSPECIFIED TYPE: Primary | ICD-10-CM

## 2024-08-26 LAB
EXPIRATION DATE: NORMAL
EXPIRATION DATE: NORMAL
FLUAV AG UPPER RESP QL IA.RAPID: NOT DETECTED
FLUBV AG UPPER RESP QL IA.RAPID: NOT DETECTED
INTERNAL CONTROL: NORMAL
INTERNAL CONTROL: NORMAL
Lab: NORMAL
Lab: NORMAL
S PYO AG THROAT QL: NEGATIVE
SARS-COV-2 AG UPPER RESP QL IA.RAPID: NOT DETECTED

## 2024-08-26 PROCEDURE — 87428 SARSCOV & INF VIR A&B AG IA: CPT | Performed by: PHYSICIAN ASSISTANT

## 2024-08-26 PROCEDURE — 87880 STREP A ASSAY W/OPTIC: CPT | Performed by: PHYSICIAN ASSISTANT

## 2024-08-26 PROCEDURE — 99213 OFFICE O/P EST LOW 20 MIN: CPT | Performed by: PHYSICIAN ASSISTANT

## 2024-08-26 RX ORDER — FLUCONAZOLE 200 MG/1
200 TABLET ORAL DAILY
Qty: 1 TABLET | Refills: 1 | Status: SHIPPED | OUTPATIENT
Start: 2024-08-26

## 2024-08-26 RX ORDER — AMOXICILLIN 500 MG/1
500 CAPSULE ORAL 3 TIMES DAILY
Qty: 30 CAPSULE | Refills: 0 | Status: SHIPPED | OUTPATIENT
Start: 2024-08-26

## 2024-08-26 RX ORDER — LEVOTHYROXINE SODIUM 125 UG/1
TABLET ORAL
COMMUNITY
Start: 2024-08-26

## 2024-08-26 NOTE — PROGRESS NOTES
Subjective   Olga Osborne is a 33 y.o. female  URI (All symptoms since late July/Yellow & brown drainage/Mostly head congestion), Sore Throat, Cough, and Ear Fullness      History of Present Illness  History of Present Illness  The patient is a 33-year-old female presenting today with symptoms of cough, congestion, and sore throat, which have been progressing over the last couple of weeks. She has been tested negative for COVID-19.    Her symptoms began at the end of July 2024, initially presenting as allergies with significant congestion and a runny nose. Over time, her nose became extremely stuffy, and she started to produce yellow and brown mucus. The infection has not spread to her chest, but she experiences a lot of drainage which triggers her cough. She reports no fever.    Despite taking numerous over-the-counter medications, her condition has not improved. She has not taken any antibiotics in the past month. She typically experiences at least one sinus infection annually. In the past, she has been treated with Z-Josiah and amoxicillin, but she had to take multiple rounds of Z-Josiah.    She is concerned about potential interactions between the prescribed antibiotic and her current hydrocortisone medication. Additionally, she mentions that she tends to develop yeast infections when taking antibiotics.    ALLERGIES  She is not allergic to any antibiotics.    The following portions of the patient's history were reviewed and updated as appropriate: allergies, current medications, past social history and problem list    Review of Systems   Constitutional:  Negative for chills, fatigue and fever.   HENT:  Positive for congestion, ear pain, postnasal drip, rhinorrhea, sinus pressure and sore throat.    Eyes:  Negative for pain.   Respiratory:  Positive for cough. Negative for shortness of breath.    Neurological:  Positive for headaches. Negative for dizziness.   Hematological:  Negative for adenopathy.       Objective      Vitals:    08/26/24 1012   BP: 106/78   Pulse: 70   Resp: 18   Temp: 98.1 °F (36.7 °C)   SpO2: 98%       Physical Exam  Vitals and nursing note reviewed.   Constitutional:       General: She is not in acute distress.     Appearance: Normal appearance. She is well-developed. She is not ill-appearing, toxic-appearing or diaphoretic.   HENT:      Head: Normocephalic and atraumatic.      Right Ear: Tympanic membrane, ear canal and external ear normal.      Left Ear: Tympanic membrane, ear canal and external ear normal.      Nose: Mucosal edema, congestion and rhinorrhea present.      Right Sinus: Maxillary sinus tenderness and frontal sinus tenderness present.      Left Sinus: Maxillary sinus tenderness and frontal sinus tenderness present.      Mouth/Throat:      Pharynx: No oropharyngeal exudate.   Eyes:      Pupils: Pupils are equal, round, and reactive to light.   Cardiovascular:      Rate and Rhythm: Normal rate and regular rhythm.   Pulmonary:      Effort: Pulmonary effort is normal.      Breath sounds: Normal breath sounds.   Skin:     General: Skin is warm and dry.   Neurological:      Mental Status: She is alert.       Physical Exam  Right ear appears inflamed. Throat is raw with significant drainage. Right nasal passage is completely obstructed.  Lungs sound normal.    Assessment & Plan   Assessment & Plan  1. Sinus Infection.  The symptoms of cough, congestion, sore throat, and yellow/brown nasal discharge, along with physical examination findings of a raw throat, blocked nasal passage, and red eardrum, suggest a bacterial sinus infection. This likely originated from allergies that led to bacterial development in the sinuses. A 10-day course of amoxicillin has been prescribed. Additionally, a prescription-strength medication has been provided to alleviate sinus congestion. She is advised to discontinue all over-the-counter medications.     2. Yeast Infection Prevention.  Given her history of developing  yeast infections with antibiotic use, Diflucan has been prescribed. She is instructed to take one if she starts experiencing any itching, with a refill available if needed.      Diagnoses and all orders for this visit:    1. Cough, unspecified type (Primary)  -     POCT rapid strep A  -     POCT SARS-CoV-2 Antigen ALEXEY + Flu    2. Sore throat  -     POCT rapid strep A  -     POCT SARS-CoV-2 Antigen ALEXEY + Flu    3. Acute non-recurrent sinusitis, unspecified location    Other orders  -     amoxicillin (AMOXIL) 500 MG capsule; Take 1 capsule by mouth 3 (Three) Times a Day.  Dispense: 30 capsule; Refill: 0  -     Chlorcyclizine-Pseudoephed 25-60 MG tablet; Take 1/2 to 1 every 12 hours as needed for congestion  Dispense: 30 tablet; Refill: 0  -     fluconazole (Diflucan) 200 MG tablet; Take 1 tablet by mouth Daily.  Dispense: 1 tablet; Refill: 1         Patient or patient representative verbalized consent for the use of Ambient Listening during the visit with  Stacy Giron PA-C for chart documentation. 8/26/2024  10:44 EDT

## 2024-09-10 ENCOUNTER — OFFICE VISIT (OUTPATIENT)
Dept: FAMILY MEDICINE CLINIC | Facility: CLINIC | Age: 34
End: 2024-09-10
Payer: COMMERCIAL

## 2024-09-10 VITALS
RESPIRATION RATE: 14 BRPM | BODY MASS INDEX: 29.02 KG/M2 | HEART RATE: 78 BPM | HEIGHT: 63 IN | WEIGHT: 163.8 LBS | OXYGEN SATURATION: 98 % | DIASTOLIC BLOOD PRESSURE: 82 MMHG | SYSTOLIC BLOOD PRESSURE: 116 MMHG

## 2024-09-10 DIAGNOSIS — M54.2 NECK PAIN: Primary | ICD-10-CM

## 2024-09-10 PROCEDURE — 99213 OFFICE O/P EST LOW 20 MIN: CPT | Performed by: PHYSICIAN ASSISTANT

## 2024-09-10 RX ORDER — CELECOXIB 200 MG/1
200 CAPSULE ORAL DAILY
Qty: 30 CAPSULE | Refills: 1 | Status: SHIPPED | OUTPATIENT
Start: 2024-09-10

## 2024-09-10 RX ORDER — CYCLOBENZAPRINE HCL 10 MG
10 TABLET ORAL 3 TIMES DAILY PRN
Qty: 30 TABLET | Refills: 1 | Status: SHIPPED | OUTPATIENT
Start: 2024-09-10

## 2024-09-10 NOTE — PROGRESS NOTES
Subjective   Olga Osborne is a 33 y.o. female  Neck Pain (Constant aching and shooting pain in back of neck and shoulder blades x3 weeks, primarily on Rt side. OTC meds not effective. No numbness. )      Neck Pain   Pertinent negatives include no chest pain, fever, numbness or weakness.     History of Present Illness  The patient presents for evaluation of neck pain.    Three weeks ago, she experienced a muscle strain after an carnival ride. A week later, she began to suffer from severe nausea that lasted for several days. She reports that her neck movement is unrestricted when moving forward, but she experiences pain when moving backward or to either side. The pain is more pronounced on the right side than the left.    The following portions of the patient's history were reviewed and updated as appropriate: allergies, current medications, past social history and problem list    Review of Systems   Constitutional:  Negative for fever.   Cardiovascular:  Negative for chest pain.   Gastrointestinal:  Negative for abdominal pain.   Genitourinary:  Negative for dysuria and pelvic pain.   Musculoskeletal:  Positive for back pain and neck pain.   Neurological:  Negative for weakness and numbness.       Objective     Vitals:    09/10/24 1615   BP: 116/82   Pulse: 78   Resp: 14   SpO2: 98%       Physical Exam  Vitals and nursing note reviewed.   Constitutional:       General: She is not in acute distress.     Appearance: Normal appearance. She is well-developed. She is not ill-appearing, toxic-appearing or diaphoretic.   Neck:      Vascular: No carotid bruit or JVD.   Cardiovascular:      Rate and Rhythm: Normal rate and regular rhythm.      Pulses: Normal pulses.      Heart sounds: Normal heart sounds. No murmur heard.  Pulmonary:      Effort: Pulmonary effort is normal. No respiratory distress.      Breath sounds: Normal breath sounds.   Abdominal:      Palpations: Abdomen is soft.      Tenderness: There is no abdominal  tenderness.   Musculoskeletal:      Cervical back: Bony tenderness present. Decreased range of motion.      Thoracic back: Decreased range of motion.   Skin:     General: Skin is warm and dry.   Neurological:      Mental Status: She is alert.       Physical Exam      Assessment & Plan   Assessment & Plan  1. Neck pain.  The discomfort appears to be muscular in nature, likely due to a strain from a ride and subsequent movements. An anti-inflammatory medication and muscle relaxants will be prescribed to manage the pain and inflammation. Instructions on exercises and movements to alleviate the pain will also be provided.      Diagnoses and all orders for this visit:    1. Neck pain (Primary)  -     celecoxib (CeleBREX) 200 MG capsule; Take 1 capsule by mouth Daily.  Dispense: 30 capsule; Refill: 1  -     cyclobenzaprine (FLEXERIL) 10 MG tablet; Take 1 tablet by mouth 3 (Three) Times a Day As Needed for Muscle Spasms.  Dispense: 30 tablet; Refill: 1       I spent 15 minutes in patient care: Reviewing records prior to the visit, examining the patient, entering orders and documentation    Part of this note may be an electronic transcription/translation of spoken language to printed text using the Dragon Dictation System.     Patient or patient representative verbalized consent for the use of Ambient Listening during the visit with  HEIDE Valenzuela for chart documentation. 9/10/2024  16:52 EDT  Answers submitted by the patient for this visit:  Primary Reason for Visit (Submitted on 9/10/2024)  What is the primary reason for your visit?: Back Pain

## 2024-10-11 ENCOUNTER — LAB (OUTPATIENT)
Dept: FAMILY MEDICINE CLINIC | Facility: CLINIC | Age: 34
End: 2024-10-11
Payer: COMMERCIAL

## 2024-10-11 ENCOUNTER — OFFICE VISIT (OUTPATIENT)
Dept: FAMILY MEDICINE CLINIC | Facility: CLINIC | Age: 34
End: 2024-10-11
Payer: COMMERCIAL

## 2024-10-11 VITALS
RESPIRATION RATE: 14 BRPM | BODY MASS INDEX: 27.54 KG/M2 | WEIGHT: 155.4 LBS | DIASTOLIC BLOOD PRESSURE: 76 MMHG | OXYGEN SATURATION: 97 % | SYSTOLIC BLOOD PRESSURE: 112 MMHG | HEART RATE: 96 BPM | HEIGHT: 63 IN | TEMPERATURE: 97.6 F

## 2024-10-11 DIAGNOSIS — R53.83 FATIGUE, UNSPECIFIED TYPE: ICD-10-CM

## 2024-10-11 DIAGNOSIS — E27.40 ADRENAL INSUFFICIENCY: ICD-10-CM

## 2024-10-11 DIAGNOSIS — M25.521 ARTHRALGIA OF BOTH ELBOWS: Primary | ICD-10-CM

## 2024-10-11 DIAGNOSIS — M25.521 ARTHRALGIA OF BOTH ELBOWS: ICD-10-CM

## 2024-10-11 DIAGNOSIS — M25.522 ARTHRALGIA OF BOTH ELBOWS: Primary | ICD-10-CM

## 2024-10-11 DIAGNOSIS — M25.522 ARTHRALGIA OF BOTH ELBOWS: ICD-10-CM

## 2024-10-11 LAB
DEPRECATED RDW RBC AUTO: 42 FL (ref 37–54)
ERYTHROCYTE [DISTWIDTH] IN BLOOD BY AUTOMATED COUNT: 12.6 % (ref 12.3–15.4)
ERYTHROCYTE [SEDIMENTATION RATE] IN BLOOD: 7 MM/HR (ref 0–20)
HCT VFR BLD AUTO: 46.4 % (ref 34–46.6)
HGB BLD-MCNC: 15.5 G/DL (ref 12–15.9)
MCH RBC QN AUTO: 30.7 PG (ref 26.6–33)
MCHC RBC AUTO-ENTMCNC: 33.4 G/DL (ref 31.5–35.7)
MCV RBC AUTO: 91.9 FL (ref 79–97)
PLATELET # BLD AUTO: 366 10*3/MM3 (ref 140–450)
PMV BLD AUTO: 10.7 FL (ref 6–12)
RBC # BLD AUTO: 5.05 10*6/MM3 (ref 3.77–5.28)
WBC NRBC COR # BLD AUTO: 10.14 10*3/MM3 (ref 3.4–10.8)

## 2024-10-11 PROCEDURE — 86038 ANTINUCLEAR ANTIBODIES: CPT | Performed by: PHYSICIAN ASSISTANT

## 2024-10-11 PROCEDURE — 82607 VITAMIN B-12: CPT | Performed by: PHYSICIAN ASSISTANT

## 2024-10-11 PROCEDURE — 85027 COMPLETE CBC AUTOMATED: CPT | Performed by: PHYSICIAN ASSISTANT

## 2024-10-11 PROCEDURE — 82746 ASSAY OF FOLIC ACID SERUM: CPT | Performed by: PHYSICIAN ASSISTANT

## 2024-10-11 PROCEDURE — 85652 RBC SED RATE AUTOMATED: CPT | Performed by: PHYSICIAN ASSISTANT

## 2024-10-11 PROCEDURE — 80053 COMPREHEN METABOLIC PANEL: CPT | Performed by: PHYSICIAN ASSISTANT

## 2024-10-11 PROCEDURE — 86431 RHEUMATOID FACTOR QUANT: CPT | Performed by: PHYSICIAN ASSISTANT

## 2024-10-11 PROCEDURE — 99213 OFFICE O/P EST LOW 20 MIN: CPT | Performed by: PHYSICIAN ASSISTANT

## 2024-10-11 PROCEDURE — 36415 COLL VENOUS BLD VENIPUNCTURE: CPT | Performed by: PHYSICIAN ASSISTANT

## 2024-10-11 PROCEDURE — 86225 DNA ANTIBODY NATIVE: CPT | Performed by: PHYSICIAN ASSISTANT

## 2024-10-11 NOTE — PROGRESS NOTES
Subjective   Olga Osborne is a 33 y.o. female  Myalgias (Bilateral forearms/hands feel heavy, weak, pins and needles. Has similar sensations in legs as well, feels fatigued. Symptoms started Sept 18 and she felt clammy at that time. )      History of Present Illness  History of Present Illness  The patient presents for evaluation of her arms and legs.    On 09/18/2023, she experienced a sudden onset of fatigue and weakness in her arms and legs, accompanied by a clammy sensation. She also reported a pins-and-needles sensation in her limbs. The severity of her symptoms fluctuates, with some days being mild and others more severe. She describes a feeling of heaviness in her hands, particularly when writing or typing. Her elbows and knees are the most affected joints, although the discomfort is not constant. She likens the sensation to growing pains, primarily affecting the muscles in her forearms and calves. She does not report pain, but rather a weakness similar to that experienced after overexertion. Occasionally, her arm gives out when she attempts to push herself up.    Concurrent with these symptoms, she began to experience bleeding. Her endocrinologist advised her to consult a gynecologist and undergo lab tests.    She has been losing weight and experiencing nausea, which has led to her being prescribed hydrocortisone 5 mg. She has requested a referral to an endocrinologist.    She has a history of pheochromocytoma in 2015 and 2023, and thyroid issues in 2016.  History of adrenal insufficiency    The following portions of the patient's history were reviewed and updated as appropriate: allergies, current medications, past social history and problem list    Review of Systems   Constitutional:  Negative for fatigue and unexpected weight change.   Respiratory:  Negative for cough, chest tightness and shortness of breath.    Cardiovascular:  Negative for chest pain, palpitations and leg swelling.   Gastrointestinal:   Negative for nausea.   Skin:  Negative for color change and rash.   Neurological:  Negative for dizziness, syncope, weakness and headaches.       Objective     Vitals:    10/11/24 1132   BP: 112/76   Pulse: 96   Resp: 14   Temp: 97.6 °F (36.4 °C)   SpO2: 97%       Physical Exam  Vitals and nursing note reviewed.   Constitutional:       General: She is not in acute distress.     Appearance: Normal appearance. She is well-developed. She is not ill-appearing, toxic-appearing or diaphoretic.   Neck:      Vascular: No carotid bruit or JVD.   Cardiovascular:      Rate and Rhythm: Normal rate and regular rhythm.      Pulses: Normal pulses.      Heart sounds: Normal heart sounds. No murmur heard.  Pulmonary:      Effort: Pulmonary effort is normal. No respiratory distress.      Breath sounds: Normal breath sounds.   Abdominal:      Palpations: Abdomen is soft.      Tenderness: There is no abdominal tenderness.   Skin:     General: Skin is warm and dry.   Neurological:      Mental Status: She is alert.       Physical Exam      Assessment & Plan   Assessment & Plan  1. Arm and leg weakness.  The patient reports experiencing arm and leg weakness, extreme fatigue, and a clammy feeling since September 18. Symptoms include muscle spasms, heaviness, and occasional pins-and-needles sensations. She also notes that her symptoms vary in intensity, affecting her ability to perform daily tasks such as writing and typing. Differential diagnoses include autoimmune conditions like rheumatoid arthritis and lupus, as well as deficiencies in B12 and folate. Blood work will be conducted, including a comprehensive metabolic panel (CMP), complete blood count (CBC), and autoimmune testing. Additionally, B12 and folate levels will be assessed.    2 Adrenal insufficiency.  The patient has a history of adrenal insufficiency and reports feeling let down by her endocrinologist. Given her complex endocrine history, a referral to a new endocrinologist  will be made for further evaluation and management.        Diagnoses and all orders for this visit:    1. Arthralgia of both elbows (Primary)  -     PRINCE; Future  -     Sedimentation Rate; Future  -     Rheumatoid Factor; Future  -     Comprehensive metabolic panel; Future    2. Adrenal insufficiency    3. Fatigue, unspecified type  -     CBC (No Diff); Future  -     Vitamin B12; Future  -     Folate; Future       I spent 15 minutes in patient care: Reviewing records prior to the visit, examining the patient, entering orders and documentation    Part of this note may be an electronic transcription/translation of spoken language to printed text using the Dragon Dictation System.     Patient or patient representative verbalized consent for the use of Ambient Listening during the visit with  HEIDE Valenzuela for chart documentation. 10/11/2024  12:43 EDT  Answers submitted by the patient for this visit:  Other (Submitted on 10/11/2024)  Please describe your symptoms.: Muscle weakness, heaviness, pins and needles- specifically in arms and legs.  Have you had these symptoms before?: No  How long have you been having these symptoms?: Greater than 2 weeks  Primary Reason for Visit (Submitted on 10/11/2024)  What is the primary reason for your visit?: Problem Not Listed

## 2024-10-12 LAB
ALBUMIN SERPL-MCNC: 4.8 G/DL (ref 3.5–5.2)
ALBUMIN/GLOB SERPL: 1.3 G/DL
ALP SERPL-CCNC: 54 U/L (ref 39–117)
ALT SERPL W P-5'-P-CCNC: 7 U/L (ref 1–33)
ANION GAP SERPL CALCULATED.3IONS-SCNC: 14.5 MMOL/L (ref 5–15)
AST SERPL-CCNC: 16 U/L (ref 1–32)
BILIRUB SERPL-MCNC: 0.7 MG/DL (ref 0–1.2)
BUN SERPL-MCNC: 13 MG/DL (ref 6–20)
BUN/CREAT SERPL: 9.6 (ref 7–25)
CALCIUM SPEC-SCNC: 10.1 MG/DL (ref 8.6–10.5)
CHLORIDE SERPL-SCNC: 100 MMOL/L (ref 98–107)
CHROMATIN AB SERPL-ACNC: <10 IU/ML (ref 0–14)
CO2 SERPL-SCNC: 21.5 MMOL/L (ref 22–29)
CREAT SERPL-MCNC: 1.35 MG/DL (ref 0.57–1)
EGFRCR SERPLBLD CKD-EPI 2021: 53 ML/MIN/1.73
FOLATE SERPL-MCNC: 4.53 NG/ML (ref 4.78–24.2)
GLOBULIN UR ELPH-MCNC: 3.6 GM/DL
GLUCOSE SERPL-MCNC: 89 MG/DL (ref 65–99)
POTASSIUM SERPL-SCNC: 5.4 MMOL/L (ref 3.5–5.2)
PROT SERPL-MCNC: 8.4 G/DL (ref 6–8.5)
SODIUM SERPL-SCNC: 136 MMOL/L (ref 136–145)
VIT B12 BLD-MCNC: >2000 PG/ML (ref 211–946)

## 2024-10-14 LAB
ANA SER QL: POSITIVE
DSDNA AB SER-ACNC: <1 IU/ML (ref 0–9)
Lab: NORMAL

## 2024-10-15 ENCOUNTER — OFFICE VISIT (OUTPATIENT)
Dept: GASTROENTEROLOGY | Facility: CLINIC | Age: 34
End: 2024-10-15
Payer: COMMERCIAL

## 2024-10-15 VITALS
BODY MASS INDEX: 28.42 KG/M2 | HEIGHT: 62 IN | OXYGEN SATURATION: 98 % | HEART RATE: 97 BPM | SYSTOLIC BLOOD PRESSURE: 110 MMHG | TEMPERATURE: 97.5 F | DIASTOLIC BLOOD PRESSURE: 68 MMHG

## 2024-10-15 DIAGNOSIS — K20.80 ESOPHAGITIS, LOS ANGELES GRADE C: ICD-10-CM

## 2024-10-15 DIAGNOSIS — K21.9 GASTROESOPHAGEAL REFLUX DISEASE, UNSPECIFIED WHETHER ESOPHAGITIS PRESENT: ICD-10-CM

## 2024-10-15 DIAGNOSIS — K44.9 HIATAL HERNIA: ICD-10-CM

## 2024-10-15 DIAGNOSIS — K58.0 IRRITABLE BOWEL SYNDROME WITH DIARRHEA: Primary | ICD-10-CM

## 2024-10-15 NOTE — PROGRESS NOTES
Follow Up      Patient Name: Olga Osborne  : 1990   MRN: [id number]     Chief Complaint:  No chief complaint on file.      History of Present Illness: Olga Osborne is a 33 y.o. female who is here today for post procedure follow up.     Pt is doing well on esomeprazole 40mg BID without breakthrough indigestion. She has had improvement in stool frequency and urgency with addition of Creon. She is having 3-5 stools per day, which continue to be liquid / frothy in form. She is taking 2 capsules TID with meals, no capsules with snacks.     Patient denies associated fever, chills, abdominal pain, indigestion, nausea, vomiting, constipation, hematemesis, dysphagia, hematochezia, melena, bloating, weight loss or gain, dysuria, jaundice or bruising.    EGD / CSY 2024 with Dr. Sutherland. Normal upper / middle third of esophagus. LA Grade C esophagitis, bx consistent with reflux esophagitis without intestinal metaplasia or dysplasia. 2cm hiatal hernia. Diffuse mild gastritis in gastric antrum and prepyloric region. Normal stomach and duodenum otherwise. Bx negative for H Pylori, celiac disease, intestinal metaplasia or dysplasia. Good bowel prep conditions. Normal terminal ileum. Normal appearing colon mucosa. Nonbleeding internal hemorrhoids.     Subjective      Review of Systems:   Review of Systems   Constitutional:  Negative for appetite change, chills, diaphoresis, fatigue, fever, unexpected weight gain and unexpected weight loss.   HENT:  Negative for drooling, facial swelling, mouth sores, rhinorrhea, sore throat, tinnitus, trouble swallowing and voice change.    Eyes: Negative.    Respiratory:  Negative for cough, chest tightness and shortness of breath.    Cardiovascular:  Negative for chest pain.   Gastrointestinal:  Positive for diarrhea. Negative for abdominal pain, blood in stool, constipation, nausea, vomiting, GERD and indigestion.   Genitourinary:  Negative for dysuria, flank pain, hematuria  and pelvic pain.   Musculoskeletal:  Negative for arthralgias and myalgias.   Skin:  Negative for color change, pallor and rash.   Neurological:  Negative for dizziness and syncope.   Psychiatric/Behavioral:  Negative for hallucinations and sleep disturbance. The patient is not nervous/anxious.    All other systems reviewed and are negative.      Medications:     Current Outpatient Medications:     Chlorcyclizine-Pseudoephed 25-60 MG tablet, Take 1/2 to 1 every 12 hours as needed for congestion, Disp: 30 tablet, Rfl: 0    cyclobenzaprine (FLEXERIL) 10 MG tablet, Take 1 tablet by mouth 3 (Three) Times a Day As Needed for Muscle Spasms., Disp: 30 tablet, Rfl: 1    esomeprazole (nexIUM) 40 MG capsule, Take 1 capsule by mouth 2 (Two) Times a Day., Disp: 60 capsule, Rfl: 5    fluconazole (Diflucan) 200 MG tablet, Take 1 tablet by mouth Daily., Disp: 1 tablet, Rfl: 1    hydrocortisone (CORTEF) 5 MG tablet, , Disp: , Rfl:     levothyroxine (SYNTHROID, LEVOTHROID) 125 MCG tablet, , Disp: , Rfl:     Pancrelipase, Lip-Prot-Amyl, (Creon) 74229-999070 units capsule delayed-release particles capsule, Take 2 capsules by mouth 3 (Three) Times a Day With Meals., Disp: 300 capsule, Rfl: 5    Sodium Sulfate-Mag Sulfate-KCl (SUTAB) 1516-412-504 MG tablet, Take 24 tablets by mouth Take As Directed., Disp: 24 tablet, Rfl: 0    Solu-CORTEF 100 MG injection, TO BE USED FOR ANY EMERGENCY ADDISONIAN CRISIS IN THE MUSCLE BY PATIENT OR BY HOUSEHOLD, Disp: , Rfl:     Allergies:   No Known Allergies    Social History:   Social History     Socioeconomic History    Marital status:    Tobacco Use    Smoking status: Never    Smokeless tobacco: Never   Vaping Use    Vaping status: Never Used   Substance and Sexual Activity    Alcohol use: No    Drug use: No    Sexual activity: Yes     Partners: Male     Birth control/protection: Condom        Surgical History:   Past Surgical History:   Procedure Laterality Date    ADRENAL GLAND SURGERY       COLONOSCOPY  2012    No findings    THYROIDECTOMY  04/28/2016        Medical History:   Past Medical History:   Diagnosis Date    Abnormal uterine bleeding     Adrenal tumor     left adrenal gland    History of medical problems 10-5-2023    Adrenal Insufficiency    Hyperthyroidism 2015    Hypothyroidism     Irritable bowel syndrome 2024    Kidney stone 2015    Ovarian cyst     right side        Objective     Physical Exam:  Vital Signs: There were no vitals filed for this visit.  There is no height or weight on file to calculate BMI.     Physical Exam  Vitals and nursing note reviewed.   Constitutional:       Appearance: Normal appearance. She is normal weight. She is not ill-appearing or diaphoretic.   HENT:      Head: Normocephalic and atraumatic.      Right Ear: External ear normal.      Left Ear: External ear normal.      Nose: Nose normal.      Mouth/Throat:      Mouth: Mucous membranes are moist.      Pharynx: Oropharynx is clear.   Eyes:      Conjunctiva/sclera: Conjunctivae normal.      Pupils: Pupils are equal, round, and reactive to light.   Neck:      Thyroid: No thyromegaly.   Cardiovascular:      Rate and Rhythm: Normal rate and regular rhythm.      Pulses: Normal pulses.      Heart sounds: Normal heart sounds.   Pulmonary:      Effort: Pulmonary effort is normal.      Breath sounds: Normal breath sounds.   Abdominal:      General: Abdomen is flat. Bowel sounds are normal. There is no distension.      Tenderness: There is no abdominal tenderness.   Musculoskeletal:         General: Normal range of motion.      Cervical back: Normal range of motion and neck supple.   Skin:     General: Skin is warm and dry.   Neurological:      General: No focal deficit present.      Mental Status: She is oriented to person, place, and time.   Psychiatric:         Mood and Affect: Mood normal.         Assessment / Plan      Assessment/Plan:   There are no diagnoses linked to this encounter.     IBS-D, suspect component  of EPI  GERD with LA Grade C esophagitis  Hiatal hernia   - continue esomeprazole 40mg BID   - increase Creon to 2 capsules with all meals and snacks, call clinic in 1-2 weeks with sx update   - consider trial of colestid, etc if no improvement with above   - previous office notes, hospital records, labs, imaging, endoscopy and pathology reports reviewed with patient    - follow up in clinic after completion of above studies   - call clinic at any time for questions or new / worsened sx    Follow Up:   Return for Next scheduled follow up.    Plan of care reviewed with the patient at the conclusion of today's visit.  Education was provided regarding diagnosis, management, and any prescribed or recommended OTC medications.  Patient verbalized understanding of and agreement with management plan.     NOTE TO PATIENT: The 21st Century Cures Act makes medical notes like these available to patients in the interest of transparency. However, be advised this is a medical document. It is intended as peer to peer communication. It is written in medical language and may contain abbreviations or verbiage that are unfamiliar. It may appear blunt or direct. Medical documents are intended to carry relevant information, facts as evident, and the clinical opinion of the practitioner.     Time Statement:   Discussed plan of care in detail with patient today. Patient verbally understands and agrees. I have spent 30 minutes reviewing available diagnostics, obtaining history, examining the patient, developing a treatment plan, and educating the patient on disease process and plan of care.     Jovanna Mora PA-C   [facility]

## 2024-10-16 ENCOUNTER — TELEPHONE (OUTPATIENT)
Dept: FAMILY MEDICINE CLINIC | Facility: CLINIC | Age: 34
End: 2024-10-16
Payer: COMMERCIAL

## 2024-10-16 NOTE — TELEPHONE ENCOUNTER
Patient called requesting update on a referral to [facility] end[facility]inologist, it has not been put in.     Please advise.

## 2024-10-23 ENCOUNTER — TELEPHONE (OUTPATIENT)
Dept: FAMILY MEDICINE CLINIC | Facility: CLINIC | Age: 34
End: 2024-10-23
Payer: COMMERCIAL

## 2024-10-23 NOTE — TELEPHONE ENCOUNTER
PATIENT CALLED AND SAID HER ENDO DOCTOR RE RAN HER LABS AND KIDNEY FUNCTION HAD DROPPED TO A LEVEL THAT HE FEELS NEEDS MORE EVALUATION. SHE WANTED TO KNOW IF A REFERRAL CAN BE PUT IN FOR SPECIALIST WITHIN [facility]. SHE SAID GFR WENT FROM 53 TO 33, AND THE CREATINE LEVEL HAS INCREASED.    PLEASE LET HER KNOW.

## 2024-10-24 DIAGNOSIS — R79.89 ABNORMAL BLOOD CREATININE LEVEL: Primary | ICD-10-CM

## 2024-10-29 ENCOUNTER — OFFICE VISIT (OUTPATIENT)
Dept: OBSTETRICS AND GYNECOLOGY | Facility: CLINIC | Age: 34
End: 2024-10-29
Payer: COMMERCIAL

## 2024-10-29 VITALS
DIASTOLIC BLOOD PRESSURE: 68 MMHG | HEIGHT: 62 IN | BODY MASS INDEX: 29.44 KG/M2 | WEIGHT: 160 LBS | SYSTOLIC BLOOD PRESSURE: 100 MMHG

## 2024-10-29 DIAGNOSIS — Z01.419 WELL WOMAN EXAM WITH ROUTINE GYNECOLOGICAL EXAM: Primary | ICD-10-CM

## 2024-10-29 NOTE — PROGRESS NOTES
"     Gynecologic Annual Exam Note        Gynecologic Exam (Annual )        Subjective     HPI  Olga Osborne is a 33 y.o.  female who presents for annual well woman exam as an established patient. Since her last visit the patient was diagnosed with pheochromocytomas, [place]'s Disease with adrenal glands removed . Patient's last menstrual period was 10/15/2024 (exact date). Her periods occur every 28 days , lasting 5-6 days.  The flow is heavy with clots requiring pad/tampon change every hour on heavy days. She denies dysmenorrhea. She reports that she had 2 periods per month for August and September.  Marital Status: .  She is sexually active. She has not had new partners. STD testing recommendations have been explained to the patient and she does not desire STD testing.    The patient would like to discuss the following complaints today: no new issues    Additional OB/GYN History   contraceptive methods: Condoms  Desires to: do not start contraception  Thromboembolic Disease: family history  History of migraines: no  Age of menarche: 10    History of STD: no    Last Pap : \"more than 5 yrs ago\". Results: negative. HPV: unknown .   Last Completed Pap Smear       This patient has no relevant Health Maintenance data.             History of abnormal Pap smear: no  Gardasil status:completed  Family history of uterine, colon, breast, or ovarian cancer: no  Performs monthly Self-Breast Exam: no  Exercises Regularly:no  Feelings of Anxiety or Depression: no  Tobacco Usage?: No       Current Outpatient Medications:     esomeprazole (nexIUM) 40 MG capsule, Take 1 capsule by mouth 2 (Two) Times a Day., Disp: 60 capsule, Rfl: 5    hydrocortisone (CORTEF) 5 MG tablet, , Disp: , Rfl:     levothyroxine (SYNTHROID, LEVOTHROID) 125 MCG tablet, , Disp: , Rfl:     Pancrelipase, Lip-Prot-Amyl, (Creon) 64896-360039 units capsule delayed-release particles capsule, Take 2 capsules by mouth 3 (Three) Times a Day With " Meals., Disp: 300 capsule, Rfl: 5    Solu-CORTEF 100 MG injection, TO BE USED FOR ANY EMERGENCY ADDISONIAN CRISIS IN THE MUSCLE BY PATIENT OR BY HOUSEHOLD, Disp: , Rfl:     cyclobenzaprine (FLEXERIL) 10 MG tablet, Take 1 tablet by mouth 3 (Three) Times a Day As Needed for Muscle Spasms. (Patient not taking: Reported on 10/29/2024), Disp: 30 tablet, Rfl: 1     Patient denies the need for medication refills today.    OB History          1    Para   1    Term   1            AB        Living   1         SAB        IAB        Ectopic        Molar        Multiple   0    Live Births   1                Health Maintenance   Topic Date Due    Annual Gynecologic Pelvic and Breast Exam  Never done    TDAP/TD VACCINES (1 - Tdap) Never done    HEPATITIS C SCREENING  Never done    ANNUAL PHYSICAL  Never done    PAP SMEAR  Never done    COVID-19 Vaccine (2023- season) 2024    INFLUENZA VACCINE  2025 (Originally 2024)    BMI FOLLOWUP  2025    MOST FORM  10/29/2025    Pneumococcal Vaccine 0-64  Aged Out       Past Medical History:   Diagnosis Date    Abnormal uterine bleeding     Adrenal tumor     left adrenal gland    History of medical problems 10-5-2023    Adrenal Insufficiency    Hyperthyroidism     Hypothyroidism     Irritable bowel syndrome     Kidney stone     Ovarian cyst     right side    PONV (postoperative nausea and vomiting)         Past Surgical History:   Procedure Laterality Date    ADRENAL GLAND SURGERY      COLONOSCOPY      No findings    THYROIDECTOMY  2016       The additional following portions of the patient's history were reviewed and updated as appropriate: allergies, current medications, past family history, past medical history, past social history, past surgical history, and problem list.    Review of Systems   Constitutional: Negative.    HENT: Negative.     Eyes: Negative.    Respiratory: Negative.     Cardiovascular: Negative.   "  Gastrointestinal: Negative.    Endocrine: Negative.    Genitourinary: Negative.    Musculoskeletal: Negative.    Skin: Negative.    Allergic/Immunologic: Negative.    Neurological: Negative.    Hematological: Negative.    Psychiatric/Behavioral: Negative.           I have reviewed and agree with the HPI, ROS, and historical information as entered above. Stephanie Wilson, APRN          Objective   /68   Ht 157.5 cm (62\")   Wt 72.6 kg (160 lb)   LMP 10/15/2024 (Exact Date)   BMI 29.26 kg/m²     Physical Exam  Vitals and nursing note reviewed. Exam conducted with a chaperone present.   Constitutional:       General: She is not in acute distress.     Appearance: Normal appearance. She is well-developed. She is not ill-appearing.   Neck:      Thyroid: No thyroid mass or thyromegaly.   Pulmonary:      Effort: Pulmonary effort is normal. No respiratory distress or retractions.   Chest:      Chest wall: No mass.   Breasts:     Right: Normal. No mass, nipple discharge, skin change or tenderness.      Left: Normal. No mass, nipple discharge, skin change or tenderness.   Abdominal:      General: There is no distension.      Palpations: Abdomen is soft. Abdomen is not rigid. There is no mass.      Tenderness: There is no abdominal tenderness. There is no guarding or rebound.      Hernia: No hernia is present.   Genitourinary:     General: Normal vulva.      Exam position: Lithotomy position.      Labia:         Right: No rash, tenderness or lesion.         Left: No rash, tenderness or lesion.       Vagina: Normal. No vaginal discharge, bleeding or lesions.      Cervix: Normal. No cervical motion tenderness, discharge, friability or cervical bleeding.      Uterus: Normal. Not enlarged, not fixed and not tender.       Adnexa: Right adnexa normal and left adnexa normal.        Right: No mass or tenderness.          Left: No mass or tenderness.        Rectum: Normal. No external hemorrhoid.   Musculoskeletal:      Cervical " back: No muscular tenderness.   Skin:     General: Skin is warm and dry.   Neurological:      Mental Status: She is alert and oriented to person, place, and time.   Psychiatric:         Mood and Affect: Mood normal.         Behavior: Behavior normal.            Assessment and Plan    Problem List Items Addressed This Visit    None  Visit Diagnoses       Well woman exam with routine gynecological exam    -  Primary    Relevant Orders    LIQUID-BASED PAP SMEAR WITH HPV GENOTYPING REGARDLESS OF INTERPRETATION ([facility])            GYN annual well woman exam.   Reviewed pap guidelines. Obtained today.  Reviewed monthly self breast exams.  Instructed to call with lumps, pain, or breast discharge.    RTC in 1 year or PRN with problems  Return in about 1 year (around 10/29/2025) for Annual physical.    Stephanie Wilson, APRN  10/29/2024

## 2024-10-30 ENCOUNTER — OFFICE VISIT (OUTPATIENT)
Dept: ENDOCRINOLOGY | Facility: CLINIC | Age: 34
End: 2024-10-30
Payer: COMMERCIAL

## 2024-10-30 VITALS
HEART RATE: 78 BPM | DIASTOLIC BLOOD PRESSURE: 72 MMHG | WEIGHT: 162.2 LBS | HEIGHT: 62 IN | SYSTOLIC BLOOD PRESSURE: 102 MMHG | BODY MASS INDEX: 29.85 KG/M2 | OXYGEN SATURATION: 98 %

## 2024-10-30 DIAGNOSIS — E89.6 ADRENAL INSUFFICIENCY AFTER ADRENALECTOMY: Primary | ICD-10-CM

## 2024-10-30 DIAGNOSIS — E03.9 ACQUIRED HYPOTHYROIDISM: ICD-10-CM

## 2024-10-30 LAB — REF LAB TEST METHOD: NORMAL

## 2024-10-30 RX ORDER — FLUDROCORTISONE ACETATE 0.1 MG/1
0.1 TABLET ORAL DAILY
Qty: 30 TABLET | Refills: 6 | Status: SHIPPED | OUTPATIENT
Start: 2024-10-30 | End: 2025-10-30

## 2024-10-30 NOTE — PROGRESS NOTES
Adrenal Problem    Subjective    Olga Osborne is a 33 y.o. female. she is being seen for consultation today at the request of  Blaise Manzano PA for management of postsurgical adrenal insufficiency.   2014 - s/p right adrenalectomy for pheochromocytoma, 2023 - left adrenalectomy for recurrence in the other adrenal. She required steroid replacement short time after 1st surgery and started steroid replacement after 2nd surgery.  Hydrocortisone regimen 10 mg -5 mg -5 mg   Adrenal crisis in Feb 2024 - admission in [facility].     She is well educated on steroid stress dosing and has hydrocortisone injection available.  Her symptoms that led to February admission progressed over few hours.  There was no precipitating illness so, did not skip her dose and did not have any GI issues to explain the onset of adrenal crisis    PMH  2016 - s/p thyroidectomy for Graves disease and goiter   on levothyroxine 125 mcg.   Most recent thyroid function test showed TSH of 4, dose was not adjusted.     In September 2024 she developed weakness, lightheadedness, muscle pain.  Patient also reported significant cravings for salt since her second surgery.  He does not recall taking fludrocortisone.  Several lab test she had abnormal elevated potassium and low sodium, slightly abnormal renal function.  Patient reported eating spoons of salt overall times a day    Review of Systems  Review of Systems   Constitutional:  Positive for fatigue.   Neurological:  Positive for dizziness, weakness, light-headedness and headache.     Current medications:  Current Outpatient Medications   Medication Sig Dispense Refill    esomeprazole (nexIUM) 40 MG capsule Take 1 capsule by mouth 2 (Two) Times a Day. 60 capsule 5    hydrocortisone (CORTEF) 5 MG tablet       levothyroxine (SYNTHROID, LEVOTHROID) 125 MCG tablet       Pancrelipase, Lip-Prot-Amyl, (Creon) [id number] units capsule delayed-release particles capsule Take 2 capsules by mouth  "3 (Three) Times a Day With Meals. 300 capsule 5    Solu-CORTEF 100 MG injection TO BE USED FOR ANY EMERGENCY ADDISONIAN CRISIS IN THE MUSCLE BY PATIENT OR BY HOUSEHOLD      cyclobenzaprine (FLEXERIL) 10 MG tablet Take 1 tablet by mouth 3 (Three) Times a Day As Needed for Muscle Spasms. (Patient not taking: Reported on 10/30/2024) 30 tablet 1    fludrocortisone 0.1 MG tablet Take 1 tablet by mouth Daily. 30 tablet 6     No current facility-administered medications for this visit.         Objective      Vitals:    10/30/24 1359   BP: 102/72   Pulse: 78   SpO2: 98%   Weight: 73.6 kg (162 lb 3.2 oz)   Height: 157.5 cm (62.01\")   Body mass index is 29.66 kg/m².  Physical Exam  Vitals reviewed.   Constitutional:       Appearance: Normal appearance.   Cardiovascular:      Rate and Rhythm: Normal rate and regular rhythm.      Pulses: Normal pulses.      Heart sounds: Normal heart sounds.   Pulmonary:      Effort: Pulmonary effort is normal.      Breath sounds: Normal breath sounds.   Musculoskeletal:         General: No swelling.   Neurological:      Mental Status: She is alert and oriented to person, place, and time.   Psychiatric:         Mood and Affect: Mood normal.         Thought Content: Thought content normal.         LABS AND IMAGING  Office Visit on 10/29/2024   Component Date Value Ref Range Status    Reference Lab Report 10/29/2024    Final                    Value:[facility]  [address] [place] [address]  Phone: [phone] or [phone]  Fax: [phone]  Blaise Roman M.D., Medical Director    PATIENT NAME                                     LABORATORY NO.  [id number]   KEVYN CONNOR                                 [id number]  [id number]                                 AGE                    SEX   SSN              CLIENT REF #  [facility]                                 33        1990      F     [id number]      [id number]    [id number]          "        REQUESTING M.D.           ATTENDING [place] [address]                        WILLA BORJA  DATE COLLECTED            DATE RECEIVED          DATE REPORTED  10/29/2024                10/29/2024             10/30/2024    ThinPrep Pap with [facility]    DIAGNOSIS:  Negative for intraepithelial lesion or malignancy    Multiple factors can influence accuracy of Pap tests; therefore, screening at  regular                           intervals is necessary for early cancer detection.      SPECIMEN ADEQUACY:  SATISFACTORY FOR EVALUATION  Transformation zone is present.  SOURCE OF SPECIMEN:       CERVICAL/ENDOCERVICAL  SLIDES:  1  CLINICAL HISTORY:  Well woman exam with routine gynecological exam    HPV  HR-HPV POOL: Negative    The Aptima HPV assay is an in vitro nucleic acid amplification test for the  qualitative detection of E6/E7 viral messenger RNA from 14 high risk types of  HPV in cervical specimens. The high risk HPV types detected include: 16, 18,  31, 33, 35, 39, 45, 51, 52, 56, 58, 59, 66, 68    CYTOTECHNOLOGIST:             CHASITY CRAVEN (ASCP)    CPT CODES:  47445, 25129     Lab on 10/11/2024   Component Date Value Ref Range Status    PRINCE Direct 10/11/2024 Positive (A)  Negative Final    Sed Rate 10/11/2024 7  0 - 20 mm/hr Final    Rheumatoid Factor Quantitative 10/11/2024 <10.0  0.0 - 14.0 IU/mL Final    Glucose 10/11/2024 89  65 - 99 mg/dL Final    BUN 10/11/2024 13  6 - 20 mg/dL Final    Creatinine 10/11/2024 1.35 (H)  0.57 - 1.00 mg/dL Final    Sodium 10/11/2024 136  136 - 145 mmol/L Final    Potassium 10/11/2024 5.4 (H)  3.5 - 5.2 mmol/L Final    Chloride 10/11/2024 100  98 - 107 mmol/L Final    CO2 10/11/2024 21.5 (L)  22.0 - 29.0 mmol/L Final    Calcium 10/11/2024 10.1  8.6 - 10.5 mg/dL Final    Total Protein 10/11/2024 8.4  6.0 - 8.5 g/dL Final    Albumin 10/11/2024 4.8  3.5 - 5.2 g/dL Final    ALT (SGPT) 10/11/2024 7  1 - 33 U/L Final    AST (SGOT) 10/11/2024 16  1 - 32 U/L  Final    Alkaline Phosphatase 10/11/2024 54  39 - 117 U/L Final    Total Bilirubin 10/11/2024 0.7  0.0 - 1.2 mg/dL Final    Globulin 10/11/2024 3.6  gm/dL Final    A/G Ratio 10/11/2024 1.3  g/dL Final    BUN/Creatinine Ratio 10/11/2024 9.6  7.0 - 25.0 Final    Anion Gap 10/11/2024 14.5  5.0 - 15.0 mmol/L Final    eGFR 10/11/2024 53.0 (L)  >60.0 mL/min/1.73 Final    WBC 10/11/2024 10.14  3.40 - 10.80 10*3/mm3 Final    RBC 10/11/2024 5.05  3.77 - 5.28 10*6/mm3 Final    Hemoglobin 10/11/2024 15.5  12.0 - 15.9 g/dL Final    Hematocrit 10/11/2024 46.4  34.0 - 46.6 % Final    MCV 10/11/2024 91.9  79.0 - 97.0 fL Final    MCH 10/11/2024 30.7  26.6 - 33.0 pg Final    MCHC 10/11/2024 33.4  31.5 - 35.7 g/dL Final    RDW 10/11/2024 12.6  12.3 - 15.4 % Final    RDW-SD 10/11/2024 42.0  37.0 - 54.0 fl Final    MPV 10/11/2024 10.7  6.0 - 12.0 fL Final    Platelets 10/11/2024 366  140 - 450 10*3/mm3 Final    Vitamin B-12 10/11/2024 >2,000 (H)  211 - 946 pg/mL Final    Folate 10/11/2024 4.53 (L)  4.78 - 24.20 ng/mL Final    Anti-DNA (DS) Ab Qn 10/11/2024 <1  0 - 9 IU/mL Final                                       Negative      <5                                     Equivocal  5 - 9                                     Positive      >9    See below: 10/11/2024 Comment   Final    Comment: Autoantibody                       Disease Association  ------------------------------------------------------------                          Condition                  Frequency  ---------------------   ------------------------   ---------  Antinuclear Antibody,    SLE, mixed connective  Direct (PRINCE-D)           tissue diseases  ---------------------   ------------------------   ---------  dsDNA                    SLE                        40 - 60%  ---------------------   ------------------------   ---------  Chromatin                Drug induced SLE                90%                           SLE                        48 -  97%  ---------------------   ------------------------   ---------  SSA (Ro)                 SLE                        25 - 35%                           Sjogren's Syndrome         40 - 70%                            Lupus                 100%  ---------------------   ------------------------   ---------  SSB (La)                 SLE                                                        10%                           Sjogren's Syndrome              30%  ---------------------   -----------------------    ---------  Sm (anti-Smith)          SLE                        15 - 30%  ---------------------   -----------------------    ---------  RNP                      Mixed Connective Tissue                           Disease                         95%  (U1 nRNP,                SLE                        30 - 50%  anti-ribonucleoprotein)  Polymyositis and/or                           Dermatomyositis                 20%  ---------------------   ------------------------   ---------  Scl-70 (antiDNA          Scleroderma (diffuse)      20 - 35%  topoisomerase)           Crest                           13%  ---------------------   ------------------------   ---------  Nicki-1                     Polymyositis and/or                           Dermatomyositis            20 - 40%  ---------------------   ------------------------   ---------  Centromere B             Scleroderma -                            Crest                           variant                         80%       1. Adrenal insufficiency after adrenalectomy    2. Acquired hypothyroidism        Assessment & Plan    (E89.6) Adrenal insufficiency after adrenalectomy - Plan: Basic Metabolic Panel, Basic Metabolic Panel, TSH, T4, Free    (E03.9) Acquired hypothyroidism - Plan: Basic Metabolic Panel, TSH, T4, Free       PLAN  1-Adrenal insufficiency secondary to bilateral adrenal surgery for pheochromocytoma.  Requires complete adrenal replacement.    Continue  hydrocortisone at same dose of 10 - 5 - 5 mg.   Start fludrocortisone 0.1 mg daily for mineralocorticoid replacement.  Patient has a clinical and biochemical signs of minimal relative corticoid insufficiency.  She consumes significant amount of salt to maintain her blood pressure.  I have advised her to monitor blood pressure and report if she develops leg swelling after initiation of fludrocortisone  Repeat BMP in 2 weeks    2- Postoperative hypothyroidism.  Continue levothyroxine 125 mcg.  I will recheck the levels with next lab draw and adjust the dose accordingly    -Her previous medical records, endocrinology notes and previous blood tests were reviewed  - diagnosis was discussed with the patient, and her questions were answered.     - old records reviewed and summarized in the HPI portion of the note.       Return in about 3 months (around 1/30/2025).           Donna Mistry MD

## 2024-11-01 DIAGNOSIS — K58.0 IRRITABLE BOWEL SYNDROME WITH DIARRHEA: Primary | ICD-10-CM

## 2024-11-01 RX ORDER — MONTELUKAST SODIUM 4 MG/1
1 TABLET, CHEWABLE ORAL 2 TIMES DAILY
Qty: 60 TABLET | Refills: 5 | Status: SHIPPED | OUTPATIENT
Start: 2024-11-01

## 2025-01-28 ENCOUNTER — TELEPHONE (OUTPATIENT)
Dept: FAMILY MEDICINE CLINIC | Facility: CLINIC | Age: 35
End: 2025-01-28
Payer: COMMERCIAL

## 2025-01-28 ENCOUNTER — LAB (OUTPATIENT)
Dept: LAB | Facility: HOSPITAL | Age: 35
End: 2025-01-28
Payer: COMMERCIAL

## 2025-01-28 DIAGNOSIS — E03.9 ACQUIRED HYPOTHYROIDISM: ICD-10-CM

## 2025-01-28 DIAGNOSIS — E89.6 ADRENAL INSUFFICIENCY AFTER ADRENALECTOMY: ICD-10-CM

## 2025-01-28 PROCEDURE — 80048 BASIC METABOLIC PNL TOTAL CA: CPT

## 2025-01-28 PROCEDURE — 84439 ASSAY OF FREE THYROXINE: CPT

## 2025-01-28 PROCEDURE — 84443 ASSAY THYROID STIM HORMONE: CPT

## 2025-01-28 NOTE — TELEPHONE ENCOUNTER
Caller: Reid Osborne    Relationship: Emergency Contact    Best call back number: [phone]    What medication are you requesting: TAMIFLU    What are your current symptoms: EXPOSED TO  WITH FLU A     How long have you been experiencing symptoms: NO SYMPTOMS YET. JUST WANTING TO BE PRO ACTIVE     Have you had these symptoms before:[x] Yes  [] No  HAD AT SAME TIME AS HAVING COVID    Have you been treated for these symptoms before:  [x] Yes  [] No    If a prescription is needed, what is your preferred pharmacy and phone number: [id number] - [place], [place] - [address] AT [facility] [address] - [phone] [facility] [phone] FX     Additional notes: PLEASE LET PATIENT KNOW IF THERE IS AN ISSUE

## 2025-01-29 LAB
ANION GAP SERPL CALCULATED.3IONS-SCNC: 13.2 MMOL/L (ref 5–15)
BUN SERPL-MCNC: 8 MG/DL (ref 6–20)
BUN/CREAT SERPL: 9.3 (ref 7–25)
CALCIUM SPEC-SCNC: 9.1 MG/DL (ref 8.6–10.5)
CHLORIDE SERPL-SCNC: 103 MMOL/L (ref 98–107)
CO2 SERPL-SCNC: 21.8 MMOL/L (ref 22–29)
CREAT SERPL-MCNC: 0.86 MG/DL (ref 0.57–1)
EGFRCR SERPLBLD CKD-EPI 2021: 90.5 ML/MIN/1.73
GLUCOSE SERPL-MCNC: 74 MG/DL (ref 65–99)
POTASSIUM SERPL-SCNC: 4.2 MMOL/L (ref 3.5–5.2)
SODIUM SERPL-SCNC: 138 MMOL/L (ref 136–145)
T4 FREE SERPL-MCNC: 1.77 NG/DL (ref 0.92–1.68)
TSH SERPL DL<=0.05 MIU/L-ACNC: 0.42 UIU/ML (ref 0.27–4.2)

## 2025-01-29 RX ORDER — OSELTAMIVIR PHOSPHATE 75 MG/1
75 CAPSULE ORAL 2 TIMES DAILY
Qty: 10 CAPSULE | Refills: 0 | Status: SHIPPED | OUTPATIENT
Start: 2025-01-29

## 2025-01-30 DIAGNOSIS — K58.0 IRRITABLE BOWEL SYNDROME WITH DIARRHEA: ICD-10-CM

## 2025-01-30 RX ORDER — COLESTIPOL HYDROCHLORIDE 1 G/1
1 TABLET ORAL 2 TIMES DAILY
Qty: 60 TABLET | Refills: 5 | Status: SHIPPED | OUTPATIENT
Start: 2025-01-30

## 2025-01-30 NOTE — TELEPHONE ENCOUNTER
Rx Refill Note  Pending Prescriptions:                       Disp   Refills    colestipol (COLESTID) 1 g tablet [Pharmacy*60 tab*5        Sig: TAKE 1 TABLET BY MOUTH TWICE DAILY    Last office visit with prescribing clinician: 10/15/2024   Last telemedicine visit with prescribing clinician: Visit date not found   Next office visit with prescribing clinician: Visit date not found         Deonna Simons MA  01/30/25, 08:18 EST

## 2025-02-04 ENCOUNTER — OFFICE VISIT (OUTPATIENT)
Dept: ENDOCRINOLOGY | Facility: CLINIC | Age: 35
End: 2025-02-04
Payer: COMMERCIAL

## 2025-02-04 VITALS
HEART RATE: 85 BPM | WEIGHT: 167 LBS | OXYGEN SATURATION: 98 % | HEIGHT: 62 IN | BODY MASS INDEX: 30.73 KG/M2 | SYSTOLIC BLOOD PRESSURE: 104 MMHG | DIASTOLIC BLOOD PRESSURE: 65 MMHG

## 2025-02-04 DIAGNOSIS — E89.6 ADRENAL INSUFFICIENCY AFTER ADRENALECTOMY: Primary | ICD-10-CM

## 2025-02-04 DIAGNOSIS — E03.9 ACQUIRED HYPOTHYROIDISM: ICD-10-CM

## 2025-02-04 PROCEDURE — 99214 OFFICE O/P EST MOD 30 MIN: CPT | Performed by: INTERNAL MEDICINE

## 2025-02-04 RX ORDER — FLUDROCORTISONE ACETATE 0.1 MG/1
0.1 TABLET ORAL DAILY
Qty: 90 TABLET | Refills: 3 | Status: SHIPPED | OUTPATIENT
Start: 2025-02-04 | End: 2026-02-04

## 2025-02-04 RX ORDER — LEVOTHYROXINE SODIUM 125 UG/1
125 TABLET ORAL
Qty: 90 TABLET | Refills: 3 | Status: SHIPPED | OUTPATIENT
Start: 2025-02-04

## 2025-02-04 NOTE — PROGRESS NOTES
Adrenal Problem    Subjective    Olga Osborne is a 34 y.o. female. she is being seen for follow-up of postsurgical adrenal insufficiency.   2014 - s/p right adrenalectomy for pheochromocytoma, 2023 - left adrenalectomy for recurrence in the other adrenal.  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 She required steroid replacement short time after 1st surgery and started steroid replacement after 2nd surgery.  Hydrocortisone regimen 10 mg -5 mg -5 mg   Adrenal crisis in Feb 2024 - admission in [facility]. She had abnormal potassium and sodium, had sx of mineralocorticoid deficiency. After starting fludrocortisone the symptoms improved and her electrolytes normalized.     She is well educated on steroid stress dosing and has hydrocortisone injection available.      Postsurgical hypothyroidism   2016 - s/p thyroidectomy for Graves disease and goiter   on levothyroxine 125 mcg.     No new complaints. Doing well.     Review of Systems  Review of Systems   Constitutional:  Positive for fatigue.   Neurological:  Positive for headache.     Current medications:  Current Outpatient Medications   Medication Sig Dispense Refill    colestipol (COLESTID) 1 g tablet TAKE 1 TABLET BY MOUTH TWICE DAILY 60 tablet 5    cyclobenzaprine (FLEXERIL) 10 MG tablet Take 1 tablet by mouth 3 (Three) Times a Day As Needed for Muscle Spasms. 30 tablet 1    esomeprazole (nexIUM) 40 MG capsule Take 1 capsule by mouth 2 (Two) Times a Day. 60 capsule 5    fludrocortisone 0.1 MG tablet Take 1 tablet by mouth Daily. 90 tablet 3    hydrocortisone (CORTEF) 5 MG tablet       levothyroxine (SYNTHROID, LEVOTHROID) 125 MCG tablet Take 1 tablet by mouth Every Morning. 90 tablet 3    Pancrelipase, Lip-Prot-Amyl, (Creon) 03521-721476 units capsule delayed-release particles capsule Take 2 capsules by mouth 3 (Three) Times a Day With Meals. 300 capsule 5    Solu-CORTEF 100 MG injection TO BE USED FOR ANY  "EMERGENCY ADDISONIAN CRISIS IN THE MUSCLE BY PATIENT OR BY HOUSEHOLD       No current facility-administered medications for this visit.         Objective      Vitals:    02/04/25 1525   BP: 104/65   Pulse: 85   SpO2: 98%   Weight: 75.8 kg (167 lb)   Height: 157.5 cm (62\")   Body mass index is 30.54 kg/m².  Physical Exam  Vitals reviewed.   Constitutional:       Appearance: Normal appearance.   Cardiovascular:      Rate and Rhythm: Normal rate and regular rhythm.      Pulses: Normal pulses.      Heart sounds: Normal heart sounds.   Pulmonary:      Effort: Pulmonary effort is normal.      Breath sounds: Normal breath sounds.   Musculoskeletal:         General: No swelling.   Neurological:      Mental Status: She is alert and oriented to person, place, and time.   Psychiatric:         Mood and Affect: Mood normal.         Thought Content: Thought content normal.         LABS AND IMAGING  Lab on 01/28/2025   Component Date Value Ref Range Status    Glucose 01/28/2025 74  65 - 99 mg/dL Final    BUN 01/28/2025 8  6 - 20 mg/dL Final    Creatinine 01/28/2025 0.86  0.57 - 1.00 mg/dL Final    Sodium 01/28/2025 138  136 - 145 mmol/L Final    Potassium 01/28/2025 4.2  3.5 - 5.2 mmol/L Final    Chloride 01/28/2025 103  98 - 107 mmol/L Final    CO2 01/28/2025 21.8 (L)  22.0 - 29.0 mmol/L Final    Calcium 01/28/2025 9.1  8.6 - 10.5 mg/dL Final    BUN/Creatinine Ratio 01/28/2025 9.3  7.0 - 25.0 Final    Anion Gap 01/28/2025 13.2  5.0 - 15.0 mmol/L Final    eGFR 01/28/2025 90.5  >60.0 mL/min/1.73 Final    TSH 01/28/2025 0.420  0.270 - 4.200 uIU/mL Final    Free T4 01/28/2025 1.77 (H)  0.92 - 1.68 ng/dL Final       1. Adrenal insufficiency after adrenalectomy    2. Acquired hypothyroidism          Assessment & Plan    (E89.6) Adrenal insufficiency after adrenalectomy - Plan: Metanephrines, Frac. Free, Plasma, Catecholamines, Fractionated, Plasma    (E03.9) Acquired hypothyroidism - Plan: T4, Free, TSH, Basic Metabolic Panel "       PLAN  1-Adrenal insufficiency secondary to bilateral adrenal surgery for pheochromocytoma.  Requires complete adrenal replacement.    Continue hydrocortisone at same dose of 10 - 5 - 5 mg.   and fludrocortisone 0.1 mg daily for mineralocorticoid replacement.      2- Postoperative hypothyroidism.  Continue levothyroxine 125 mcg.  Recent labs were normal.     3. 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   Cont with yearly plasma metanephrines and catecholamines evaluation           Return in about 4 months (around 6/4/2025).           Donna Mistry MD

## 2025-02-24 ENCOUNTER — PATIENT ROUNDING (BHMG ONLY) (OUTPATIENT)
Dept: URGENT CARE | Facility: CLINIC | Age: 35
End: 2025-02-24
Payer: COMMERCIAL

## 2025-05-13 RX ORDER — ONDANSETRON 4 MG/1
4 TABLET, ORALLY DISINTEGRATING ORAL EVERY 8 HOURS PRN
Qty: 30 TABLET | Refills: 3 | Status: SHIPPED | OUTPATIENT
Start: 2025-05-13

## 2025-06-24 ENCOUNTER — OFFICE VISIT (OUTPATIENT)
Dept: ENDOCRINOLOGY | Facility: CLINIC | Age: 35
End: 2025-06-24
Payer: COMMERCIAL

## 2025-06-24 VITALS
BODY MASS INDEX: 29.43 KG/M2 | WEIGHT: 166.1 LBS | DIASTOLIC BLOOD PRESSURE: 60 MMHG | HEIGHT: 63 IN | HEART RATE: 77 BPM | SYSTOLIC BLOOD PRESSURE: 98 MMHG | OXYGEN SATURATION: 99 %

## 2025-06-24 DIAGNOSIS — E89.6 ADRENAL INSUFFICIENCY AFTER ADRENALECTOMY: ICD-10-CM

## 2025-06-24 DIAGNOSIS — E03.9 ACQUIRED HYPOTHYROIDISM: Primary | ICD-10-CM

## 2025-06-24 PROCEDURE — 84443 ASSAY THYROID STIM HORMONE: CPT | Performed by: INTERNAL MEDICINE

## 2025-06-24 PROCEDURE — 99214 OFFICE O/P EST MOD 30 MIN: CPT | Performed by: INTERNAL MEDICINE

## 2025-06-24 PROCEDURE — 80048 BASIC METABOLIC PNL TOTAL CA: CPT | Performed by: INTERNAL MEDICINE

## 2025-06-24 PROCEDURE — 82384 ASSAY THREE CATECHOLAMINES: CPT | Performed by: INTERNAL MEDICINE

## 2025-06-24 PROCEDURE — 83835 ASSAY OF METANEPHRINES: CPT | Performed by: INTERNAL MEDICINE

## 2025-06-24 PROCEDURE — 84439 ASSAY OF FREE THYROXINE: CPT | Performed by: INTERNAL MEDICINE

## 2025-06-24 NOTE — PROGRESS NOTES
Adrenal insufficiency after adrenalectomy (Follow up)    Subjective    Olga Osborne is a 34 y.o. female. she is being seen for follow-up of postsurgical adrenal insufficiency.   2014 - s/p right adrenalectomy for pheochromocytoma, 2023 - s/p left adrenalectomy due to recurrence.  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     Postoperatively she started steroid replacement:   Hydrocortisone regimen 10 mg -5 mg -5 mg   Fludrocortisone    Postsurgical hypothyroidism   2016 - s/p thyroidectomy for Graves disease and goiter   on levothyroxine 125 mcg.     No new complaints. Doing well.   She had an episode of dehydration, required IVF. Takes meds consistently     Review of Systems  Review of Systems   Constitutional:  Positive for fatigue.   Neurological:  Positive for headache.     Current medications:  Current Outpatient Medications   Medication Sig Dispense Refill    colestipol (COLESTID) 1 g tablet TAKE 1 TABLET BY MOUTH TWICE DAILY 60 tablet 5    cyclobenzaprine (FLEXERIL) 10 MG tablet Take 1 tablet by mouth 3 (Three) Times a Day As Needed for Muscle Spasms. (Patient taking differently: Take 1 tablet by mouth As Needed for Muscle Spasms.) 30 tablet 1    esomeprazole (nexIUM) 40 MG capsule Take 1 capsule by mouth 2 (Two) Times a Day. 60 capsule 5    fludrocortisone 0.1 MG tablet Take 1 tablet by mouth Daily. 90 tablet 3    hydrocortisone (CORTEF) 5 MG tablet       levothyroxine (SYNTHROID, LEVOTHROID) 125 MCG tablet Take 1 tablet by mouth Every Morning. 90 tablet 3    ondansetron ODT (ZOFRAN-ODT) 4 MG disintegrating tablet Place 1 tablet on the tongue Every 8 (Eight) Hours As Needed for Nausea or Vomiting. (Patient taking differently: Place 1 tablet on the tongue As Needed for Nausea or Vomiting.) 30 tablet 3    Pancrelipase, Lip-Prot-Amyl, (Creon) 99242-035813 units capsule delayed-release particles capsule Take 2 capsules by mouth 3 (Three) Times a Day With Meals. 300  "capsule 5    Solu-CORTEF 100 MG injection TO BE USED FOR ANY EMERGENCY ADDISONIAN CRISIS IN THE MUSCLE BY PATIENT OR BY HOUSEHOLD       No current facility-administered medications for this visit.         Objective      Vitals:    06/24/25 1456   BP: 98/60   BP Location: Left arm   Patient Position: Sitting   Cuff Size: Adult   Pulse: 77   SpO2: 99%   Weight: 75.3 kg (166 lb 1.6 oz)   Height: 160 cm (63\")   Body mass index is 29.42 kg/m².  Physical Exam  Vitals reviewed.   Constitutional:       Appearance: Normal appearance.   Cardiovascular:      Rate and Rhythm: Normal rate and regular rhythm.      Pulses: Normal pulses.      Heart sounds: Normal heart sounds.   Pulmonary:      Effort: Pulmonary effort is normal.      Breath sounds: Normal breath sounds.   Musculoskeletal:         General: No swelling.   Neurological:      Mental Status: She is alert and oriented to person, place, and time.   Psychiatric:         Mood and Affect: Mood normal.         Thought Content: Thought content normal.         LABS AND IMAGING  No visits with results within 1 Month(s) from this visit.   Latest known visit with results is:   Admission on 02/23/2025, Discharged on 02/23/2025   Component Date Value Ref Range Status    SARS Antigen 02/23/2025 Not Detected  Not Detected, Presumptive Negative Final    Influenza A Antigen ALEXEY 02/23/2025 Not Detected  Not Detected Final    Influenza B Antigen ALEXEY 02/23/2025 Not Detected  Not Detected Final    Internal Control 02/23/2025 Passed  Passed Final    Lot Number 02/23/2025 [id number]   Final    Expiration Date 02/23/2025 11/21/2025   Final       1. Acquired hypothyroidism    2. Adrenal insufficiency after adrenalectomy          Assessment & Plan    (E03.9) Acquired hypothyroidism    (E89.6) Adrenal insufficiency after adrenalectomy       PLAN  1-Adrenal insufficiency secondary to bilateral adrenalectomy for pheochromocytoma.  Requires complete adrenal replacement.    Continue hydrocortisone " at same dose of 10 - 5 - 5 mg.   and fludrocortisone 0.1 mg daily  Stress dosing discussed.     2- Postoperative hypothyroidism.  Continue levothyroxine 125 mcg.  Repeat labs today .     3. 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   Cont with yearly plasma metanephrines and catecholamines evaluation           No follow-ups on file.           Donna Mistry MD

## 2025-06-25 LAB
ANION GAP SERPL CALCULATED.3IONS-SCNC: 10.3 MMOL/L (ref 5–15)
BUN SERPL-MCNC: 7 MG/DL (ref 6–20)
BUN/CREAT SERPL: 9.1 (ref 7–25)
CALCIUM SPEC-SCNC: 8.8 MG/DL (ref 8.6–10.5)
CHLORIDE SERPL-SCNC: 104 MMOL/L (ref 98–107)
CO2 SERPL-SCNC: 22.7 MMOL/L (ref 22–29)
CREAT SERPL-MCNC: 0.77 MG/DL (ref 0.57–1)
EGFRCR SERPLBLD CKD-EPI 2021: 103.3 ML/MIN/1.73
GLUCOSE SERPL-MCNC: 81 MG/DL (ref 65–99)
POTASSIUM SERPL-SCNC: 3.8 MMOL/L (ref 3.5–5.2)
SODIUM SERPL-SCNC: 137 MMOL/L (ref 136–145)
T4 FREE SERPL-MCNC: 1.5 NG/DL (ref 0.92–1.68)
TSH SERPL DL<=0.05 MIU/L-ACNC: 0.03 UIU/ML (ref 0.27–4.2)

## 2025-07-01 LAB
DOPAMINE SERPL-MCNC: 13.5 PG/ML (ref 0–36.7)
EPINEPH PLAS-MCNC: <10 PG/ML (ref 0–55.4)
NOREPINEPH PLAS-MCNC: 680 PG/ML (ref 115–524)

## 2025-07-02 LAB
METANEPH FREE SERPL-MCNC: <25 PG/ML (ref 0–88)
NORMETANEPHRINE SERPL-MCNC: 140.7 PG/ML (ref 0–210.1)

## 2025-07-04 RX ORDER — HYDROCORTISONE 5 MG/1
20 TABLET ORAL DAILY
Qty: 150 TABLET | Refills: 11 | Status: SHIPPED | OUTPATIENT
Start: 2025-07-04

## 2025-07-23 ENCOUNTER — HOSPITAL ENCOUNTER (OUTPATIENT)
Dept: MRI IMAGING | Facility: HOSPITAL | Age: 35
Discharge: HOME | End: 2025-07-23
Admitting: INTERNAL MEDICINE
Payer: COMMERCIAL

## 2025-07-23 DIAGNOSIS — D35.00 PHEOCHROMOCYTOMA, UNSPECIFIED LATERALITY: ICD-10-CM

## 2025-07-23 DIAGNOSIS — E89.6 ADRENAL INSUFFICIENCY AFTER ADRENALECTOMY: ICD-10-CM

## 2025-07-23 PROCEDURE — 25510000002 GADOBENATE DIMEGLUMINE 529 MG/ML SOLUTION: Performed by: INTERNAL MEDICINE

## 2025-07-23 PROCEDURE — A9577 INJ MULTIHANCE: HCPCS | Performed by: INTERNAL MEDICINE

## 2025-07-23 PROCEDURE — 74183 MRI ABD W/O CNTR FLWD CNTR: CPT

## 2025-07-23 RX ADMIN — GADOBENATE DIMEGLUMINE 14 ML: 529 INJECTION, SOLUTION INTRAVENOUS at 10:49

## 2025-08-11 RX ORDER — FLUDROCORTISONE ACETATE 0.1 MG/1
TABLET ORAL DAILY
Qty: 30 TABLET | OUTPATIENT
Start: 2025-08-11